# Patient Record
Sex: FEMALE | Race: WHITE | NOT HISPANIC OR LATINO | Employment: FULL TIME | URBAN - METROPOLITAN AREA
[De-identification: names, ages, dates, MRNs, and addresses within clinical notes are randomized per-mention and may not be internally consistent; named-entity substitution may affect disease eponyms.]

---

## 2017-01-04 ENCOUNTER — PATIENT MESSAGE (OUTPATIENT)
Dept: FAMILY MEDICINE | Facility: CLINIC | Age: 52
End: 2017-01-04

## 2017-01-04 RX ORDER — METHYLPREDNISOLONE 4 MG/1
TABLET ORAL
Qty: 1 PACKAGE | Refills: 0 | Status: SHIPPED | OUTPATIENT
Start: 2017-01-04 | End: 2017-01-25

## 2017-01-05 RX ORDER — DOXYCYCLINE 100 MG/1
100 CAPSULE ORAL 2 TIMES DAILY
Qty: 20 CAPSULE | Refills: 0 | Status: SHIPPED | OUTPATIENT
Start: 2017-01-05 | End: 2017-01-15

## 2017-01-12 RX ORDER — CEPHALEXIN 250 MG/1
CAPSULE ORAL
Qty: 60 EACH | Refills: 10 | Status: SHIPPED | OUTPATIENT
Start: 2017-01-12 | End: 2020-02-26 | Stop reason: SDUPTHER

## 2017-03-25 RX ORDER — FAMCICLOVIR 500 MG/1
TABLET ORAL
Qty: 30 TABLET | Refills: 1 | Status: SHIPPED | OUTPATIENT
Start: 2017-03-25 | End: 2017-10-17 | Stop reason: SDUPTHER

## 2017-03-25 RX ORDER — ALPRAZOLAM 0.25 MG/1
0.25 TABLET ORAL 2 TIMES DAILY
Qty: 60 TABLET | Refills: 1 | Status: SHIPPED | OUTPATIENT
Start: 2017-03-25 | End: 2017-10-17 | Stop reason: SDUPTHER

## 2017-04-13 ENCOUNTER — TELEPHONE (OUTPATIENT)
Dept: FAMILY MEDICINE | Facility: CLINIC | Age: 52
End: 2017-04-13

## 2017-04-13 DIAGNOSIS — Z00.00 ROUTINE HEALTH MAINTENANCE: Primary | ICD-10-CM

## 2017-04-27 ENCOUNTER — PATIENT OUTREACH (OUTPATIENT)
Dept: ADMINISTRATIVE | Facility: HOSPITAL | Age: 52
End: 2017-04-27
Payer: COMMERCIAL

## 2017-04-27 DIAGNOSIS — Z11.59 NEED FOR HEPATITIS C SCREENING TEST: Primary | ICD-10-CM

## 2017-04-27 DIAGNOSIS — Z12.39 BREAST CANCER SCREENING: ICD-10-CM

## 2017-05-04 ENCOUNTER — LAB VISIT (OUTPATIENT)
Dept: LAB | Facility: HOSPITAL | Age: 52
End: 2017-05-04
Attending: FAMILY MEDICINE
Payer: COMMERCIAL

## 2017-05-04 DIAGNOSIS — Z11.59 NEED FOR HEPATITIS C SCREENING TEST: ICD-10-CM

## 2017-05-04 DIAGNOSIS — Z00.00 ROUTINE HEALTH MAINTENANCE: ICD-10-CM

## 2017-05-04 LAB
ALBUMIN SERPL BCP-MCNC: 4 G/DL
ALP SERPL-CCNC: 78 U/L
ALT SERPL W/O P-5'-P-CCNC: 19 U/L
ANION GAP SERPL CALC-SCNC: 7 MMOL/L
AST SERPL-CCNC: 22 U/L
BASOPHILS # BLD AUTO: 0.04 K/UL
BASOPHILS NFR BLD: 0.6 %
BILIRUB SERPL-MCNC: 0.5 MG/DL
BUN SERPL-MCNC: 7 MG/DL
CALCIUM SERPL-MCNC: 9.6 MG/DL
CHLORIDE SERPL-SCNC: 107 MMOL/L
CHOLEST/HDLC SERPL: 3.8 {RATIO}
CO2 SERPL-SCNC: 27 MMOL/L
CREAT SERPL-MCNC: 0.8 MG/DL
DIFFERENTIAL METHOD: NORMAL
EOSINOPHIL # BLD AUTO: 0.2 K/UL
EOSINOPHIL NFR BLD: 2.9 %
ERYTHROCYTE [DISTWIDTH] IN BLOOD BY AUTOMATED COUNT: 13.9 %
EST. GFR  (AFRICAN AMERICAN): >60 ML/MIN/1.73 M^2
EST. GFR  (NON AFRICAN AMERICAN): >60 ML/MIN/1.73 M^2
GLUCOSE SERPL-MCNC: 94 MG/DL
HCT VFR BLD AUTO: 39.3 %
HDL/CHOLESTEROL RATIO: 26.4 %
HDLC SERPL-MCNC: 212 MG/DL
HDLC SERPL-MCNC: 56 MG/DL
HGB BLD-MCNC: 13.1 G/DL
LDLC SERPL CALC-MCNC: 138.2 MG/DL
LYMPHOCYTES # BLD AUTO: 2.2 K/UL
LYMPHOCYTES NFR BLD: 35.9 %
MCH RBC QN AUTO: 30.7 PG
MCHC RBC AUTO-ENTMCNC: 33.3 %
MCV RBC AUTO: 92 FL
MONOCYTES # BLD AUTO: 0.5 K/UL
MONOCYTES NFR BLD: 7.4 %
NEUTROPHILS # BLD AUTO: 3.3 K/UL
NEUTROPHILS NFR BLD: 53 %
NONHDLC SERPL-MCNC: 156 MG/DL
PLATELET # BLD AUTO: 303 K/UL
PMV BLD AUTO: 9.7 FL
POTASSIUM SERPL-SCNC: 4.8 MMOL/L
PROT SERPL-MCNC: 7.3 G/DL
RBC # BLD AUTO: 4.27 M/UL
SODIUM SERPL-SCNC: 141 MMOL/L
TRIGL SERPL-MCNC: 89 MG/DL
TSH SERPL DL<=0.005 MIU/L-ACNC: 1.41 UIU/ML
WBC # BLD AUTO: 6.21 K/UL

## 2017-05-04 PROCEDURE — 85025 COMPLETE CBC W/AUTO DIFF WBC: CPT

## 2017-05-04 PROCEDURE — 86803 HEPATITIS C AB TEST: CPT

## 2017-05-04 PROCEDURE — 84443 ASSAY THYROID STIM HORMONE: CPT

## 2017-05-04 PROCEDURE — 80053 COMPREHEN METABOLIC PANEL: CPT

## 2017-05-04 PROCEDURE — 36415 COLL VENOUS BLD VENIPUNCTURE: CPT | Mod: PO

## 2017-05-04 PROCEDURE — 80061 LIPID PANEL: CPT

## 2017-05-05 LAB — HCV AB SERPL QL IA: NEGATIVE

## 2017-05-10 ENCOUNTER — OFFICE VISIT (OUTPATIENT)
Dept: FAMILY MEDICINE | Facility: CLINIC | Age: 52
End: 2017-05-10
Payer: COMMERCIAL

## 2017-05-10 VITALS
SYSTOLIC BLOOD PRESSURE: 107 MMHG | HEIGHT: 67 IN | DIASTOLIC BLOOD PRESSURE: 67 MMHG | BODY MASS INDEX: 30.1 KG/M2 | HEART RATE: 88 BPM | WEIGHT: 191.81 LBS

## 2017-05-10 DIAGNOSIS — Z00.00 ROUTINE CHECK-UP: Primary | ICD-10-CM

## 2017-05-10 PROCEDURE — 90715 TDAP VACCINE 7 YRS/> IM: CPT | Mod: S$GLB,,, | Performed by: FAMILY MEDICINE

## 2017-05-10 PROCEDURE — 99999 PR PBB SHADOW E&M-EST. PATIENT-LVL II: CPT | Mod: PBBFAC,,, | Performed by: FAMILY MEDICINE

## 2017-05-10 PROCEDURE — 99396 PREV VISIT EST AGE 40-64: CPT | Mod: 25,S$GLB,, | Performed by: FAMILY MEDICINE

## 2017-05-10 PROCEDURE — 90471 IMMUNIZATION ADMIN: CPT | Mod: S$GLB,,, | Performed by: FAMILY MEDICINE

## 2017-05-10 RX ORDER — PYRIDOXINE HCL (VITAMIN B6) 50 MG
TABLET ORAL
COMMUNITY
Start: 2017-05-08

## 2017-05-10 RX ORDER — LEVOMEFOLATE CALCIUM 15 MG
TABLET ORAL
COMMUNITY
Start: 2017-05-08

## 2017-05-10 RX ORDER — HYDROXOCOBALAMIN 1000 UG/ML
INJECTION, SOLUTION INTRAMUSCULAR
COMMUNITY
Start: 2017-04-04

## 2017-05-10 RX ORDER — ERGOCALCIFEROL 1.25 MG/1
CAPSULE ORAL
COMMUNITY
Start: 2017-05-08

## 2017-05-10 RX ORDER — PROGESTERONE 200 MG/1
CAPSULE ORAL
COMMUNITY
Start: 2017-03-24

## 2017-05-10 NOTE — PROGRESS NOTES
The patient presents today for general health evaluation and counseling      Past Medical History:  Past Medical History:   Diagnosis Date    Asthma      Past Surgical History:   Procedure Laterality Date     SECTION      ENDOMETRIAL ABLATION  2013    TUBAL LIGATION       Review of patient's allergies indicates:   Allergen Reactions    Iodinated contrast media - oral and iv dye Shortness Of Breath and Rash     Current Outpatient Prescriptions on File Prior to Visit   Medication Sig Dispense Refill    ADVAIR DISKUS 100-50 mcg/dose diskus inhaler USE 1 INHALATION TWO TIMES A DAY 60 each 10    albuterol (VENTOLIN HFA) 90 mcg/actuation inhaler Inhale 2 puffs into the lungs every 6 (six) hours as needed for Wheezing. 54 g 3    alprazolam (XANAX) 0.25 MG tablet Take 1 tablet (0.25 mg total) by mouth 2 (two) times daily. 60 tablet 1    famciclovir (FAMVIR) 500 MG tablet TAKE ONE TABLET BY MOUTH EVERY 8 HOURS -IMPORTANT: FINISH ALL OF THISMEDICINE UNLESS OTHERWISE DIRECTED BY YOUR DOCTOR. 30 tablet 1    hydrOXYzine pamoate (VISTARIL) 25 MG Cap Take 1 capsule (25 mg total) by mouth every 4 (four) hours as needed (Nausea or vomiting). 30 capsule 1    [DISCONTINUED] budesonide-formoterol 160-4.5 mcg (SYMBICORT) 160-4.5 mcg/actuation HFAA Inhale 2 puffs into the lungs every 12 (twelve) hours. 10.2 g 3    [DISCONTINUED] cetirizine (ZYRTEC) 10 MG tablet Take 1 tablet (10 mg total) by mouth once daily. 10 tablet 0    [DISCONTINUED] citalopram (CELEXA) 10 MG tablet Take 1 tablet (10 mg total) by mouth once daily. 90 tablet 1    [DISCONTINUED] ibuprofen (ADVIL,MOTRIN) 800 MG tablet Take 1 tablet (800 mg total) by mouth 3 (three) times daily. 20 tablet 0    [DISCONTINUED] scopolamine (TRANSDERM-SCOP) 1.5 mg (1 mg over 3 days) Place 1 patch (1.5 mg total) onto the skin every 72 hours. 28 patch 1     No current facility-administered medications on file prior to visit.      Social History     Social History     Marital status:      Spouse name: N/A    Number of children: N/A    Years of education: N/A     Occupational History    Not on file.     Social History Main Topics    Smoking status: Never Smoker    Smokeless tobacco: Never Used    Alcohol use Yes      Comment: wine social    Drug use: No    Sexual activity: Yes     Partners: Male     Birth control/ protection: Surgical     Other Topics Concern    Not on file     Social History Narrative     Family History   Problem Relation Age of Onset    Cancer Mother      ovarian    Heart disease Father     Kidney disease Paternal Aunt     Cancer Maternal Grandmother          ROS:GENERAL: No fever, chills, fatigability or weight loss.  SKIN: No rashes, itching or changes in color or texture of skin.  HEAD: No headaches or recent head trauma.EYES: Visual acuity fine. No photophobia, ocular pain or diplopia.EARS: Denies ear pain, discharge or vertigo.NOSE: No loss of smell, no epistaxis or postnasal drip.MOUTH & THROAT: No hoarseness or change in voice. No excessive gum bleeding.NODES: Denies swollen glands.  CHEST: Denies KOHLER, cyanosis, wheezing, cough and sputum production.  CARDIOVASCULAR: Denies chest pain, PND, orthopnea or reduced exercise tolerance.  ABDOMEN: Appetite fine. No weight loss. Denies diarrhea, abdominal pain, hematemesis or blood in stool.  URINARY: No flank pain, dysuria or hematuria.  PERIPHERAL VASCULAR: No claudication or cyanosis.  MUSCULOSKELETAL: See above.  NEUROLOGIC: No history of seizures, paralysis, alteration of gait or coordination.  PE:   HEAD: Normocephalic, atraumatic.EYES: PERRL. EOMI.   EARS: TM's intact. Light reflex normal. No retraction or perforation.   NOSE: Mucosa pink. Airway clear.MOUTH & THROAT: No tonsillar enlargement. No pharyngeal erythema or exudate. No stridor.  NODES: No cervical, axillary or inguinal lymph node enlargement.  CHEST: Lungs clear to auscultation.  CARDIOVASCULAR: Normal S1, S2. No rubs,  murmurs or gallops.  ABDOMEN: Bowel sounds normal. Not distended. Soft. No tenderness or masses.  MUSCULOSKELETAL: No palpable abnormality  NEUROLOGIC: Cranial Nerves: II-XII grossly intact.  Motor: 5/5 strength major flexors/extensors.  DTR's: Knees, Ankles 2+ and equal bilaterally; downgoing toes.  Sensory: Intact to light touch distally.  Gait & Posture: Normal gait and fine motion. No cerebellar signs.     Impression:Routine health check  Plan:Lab eval  Rec diet and ex recs  Rev age appropriate screenings

## 2017-05-15 ENCOUNTER — TELEPHONE (OUTPATIENT)
Dept: GASTROENTEROLOGY | Facility: CLINIC | Age: 52
End: 2017-05-15

## 2017-10-17 RX ORDER — FAMCICLOVIR 500 MG/1
TABLET ORAL
Qty: 30 TABLET | Refills: 11 | Status: SHIPPED | OUTPATIENT
Start: 2017-10-17 | End: 2021-10-12 | Stop reason: SDUPTHER

## 2017-10-17 RX ORDER — ALPRAZOLAM 0.25 MG/1
0.25 TABLET ORAL 2 TIMES DAILY
Qty: 60 TABLET | Refills: 5 | Status: SHIPPED | OUTPATIENT
Start: 2017-10-17 | End: 2020-02-26

## 2017-12-23 RX ORDER — DOXYCYCLINE 100 MG/1
100 CAPSULE ORAL 2 TIMES DAILY
Qty: 14 CAPSULE | Refills: 0 | Status: SHIPPED | OUTPATIENT
Start: 2017-12-23 | End: 2019-09-06 | Stop reason: ALTCHOICE

## 2017-12-23 RX ORDER — METHYLPREDNISOLONE 4 MG/1
TABLET ORAL
Qty: 1 PACKAGE | Refills: 0 | Status: SHIPPED | OUTPATIENT
Start: 2017-12-23 | End: 2018-01-13

## 2018-09-24 ENCOUNTER — PATIENT OUTREACH (OUTPATIENT)
Dept: ADMINISTRATIVE | Facility: HOSPITAL | Age: 53
End: 2018-09-24

## 2018-09-24 NOTE — PROGRESS NOTES
Contacted patient as a reminder for pap smear. Left message requesting a call back.    Patient returned call and states she had a pap smear and mammogram with Alysia Bhandari. Will get [facility] and request report.

## 2018-11-09 ENCOUNTER — PATIENT MESSAGE (OUTPATIENT)
Dept: FAMILY MEDICINE | Facility: CLINIC | Age: 53
End: 2018-11-09

## 2018-11-12 RX ORDER — ALBUTEROL SULFATE 90 UG/1
2 AEROSOL, METERED RESPIRATORY (INHALATION) EVERY 6 HOURS PRN
Qty: 54 G | Refills: 12 | Status: SHIPPED | OUTPATIENT
Start: 2018-11-12 | End: 2020-02-24

## 2018-12-05 ENCOUNTER — PATIENT OUTREACH (OUTPATIENT)
Dept: ADMINISTRATIVE | Facility: HOSPITAL | Age: 53
End: 2018-12-05

## 2018-12-06 ENCOUNTER — PATIENT MESSAGE (OUTPATIENT)
Dept: ADMINISTRATIVE | Facility: HOSPITAL | Age: 53
End: 2018-12-06

## 2019-01-22 ENCOUNTER — PATIENT MESSAGE (OUTPATIENT)
Dept: ADMINISTRATIVE | Facility: HOSPITAL | Age: 54
End: 2019-01-22

## 2019-01-23 ENCOUNTER — PATIENT OUTREACH (OUTPATIENT)
Dept: ADMINISTRATIVE | Facility: HOSPITAL | Age: 54
End: 2019-01-23

## 2019-01-23 DIAGNOSIS — Z12.11 ENCOUNTER FOR SCREENING COLONOSCOPY FOR NON-HIGH-RISK PATIENT: Primary | ICD-10-CM

## 2019-08-26 ENCOUNTER — TELEPHONE (OUTPATIENT)
Dept: FAMILY MEDICINE | Facility: CLINIC | Age: 54
End: 2019-08-26

## 2019-08-26 NOTE — TELEPHONE ENCOUNTER
----- Message from Saniya Arambula sent at 8/26/2019 10:49 AM CDT -----  Contact: self  Type:  Patient Returning Call    Who Called:pt  Who Left Message for Patient:jt  Does the patient know what this is regarding?:no  Would the patient rather a call back or a response via [facility]? Call back  Best Call Back Number:[phone]  Additional Information: none    Thanks,  Saniya Arambula

## 2019-08-26 NOTE — TELEPHONE ENCOUNTER
----- Message from Marco Castañeda sent at 8/26/2019  8:38 AM CDT -----  The pt would like to be seen by Dr. Dickerson for a consult for a colonoscopy procedure.  The pt can be reached at [phone].

## 2019-09-05 ENCOUNTER — PATIENT OUTREACH (OUTPATIENT)
Dept: ADMINISTRATIVE | Facility: HOSPITAL | Age: 54
End: 2019-09-05

## 2019-09-06 ENCOUNTER — PATIENT MESSAGE (OUTPATIENT)
Dept: FAMILY MEDICINE | Facility: CLINIC | Age: 54
End: 2019-09-06

## 2019-09-06 ENCOUNTER — OFFICE VISIT (OUTPATIENT)
Dept: FAMILY MEDICINE | Facility: CLINIC | Age: 54
End: 2019-09-06
Payer: COMMERCIAL

## 2019-09-06 ENCOUNTER — PATIENT OUTREACH (OUTPATIENT)
Dept: ADMINISTRATIVE | Facility: HOSPITAL | Age: 54
End: 2019-09-06

## 2019-09-06 DIAGNOSIS — Z12.11 COLON CANCER SCREENING: Primary | ICD-10-CM

## 2019-09-06 PROCEDURE — 99213 PR OFFICE/OUTPT VISIT, EST, LEVL III, 20-29 MIN: ICD-10-PCS | Mod: 95,,, | Performed by: FAMILY MEDICINE

## 2019-09-06 PROCEDURE — 99213 OFFICE O/P EST LOW 20 MIN: CPT | Mod: 95,,, | Performed by: FAMILY MEDICINE

## 2019-09-06 NOTE — PROGRESS NOTES
Primary Care Telemedicine Note    The patient location is:  Patient Home   The chief complaint leading to consultation is: colon cancer screening  Total time spent with patient: 12 minutes      Visit type: Virtual visit with synchronous audio only and video  Each patient to whom he or she provides medical services by telemedicine is:  (1) informed of the relationship between the physician and patient and the respective role of any other health care provider with respect to management of the patient; and (2) notified that he or she may decline to receive medical services by telemedicine and may withdraw from such care at any time.            Subjective:      Patient ID: Christina Bunn is a 54 y.o. female.    Chief Complaint: desires colon cancer screening  HPI the patient schedule an appointment as she wanted to discuss performing a colonoscopy on her for screening purposes.  The patient does not have any family history or personal history of colon polyps or colon cancer.  She has never had a colonoscopy for screening before.  Patient is not having any abdominal pain, bleeding, nausea vomiting, melena, hematochezia, diarrhea or constipation.  We reviewed her complete past medical history today and I updated and reconciled her med list.  In addition, I updated her family history which again is negative for GI cancers or pre cancers.  We also reviewed her gyn history of screening and she recently had a mammogram and Pap smear.  She will of low that dated to us for reconciliation with our health maintenance area.    Health Maintenance Due   Topic Date Due    Colonoscopy  2015    Pap Smear with HPV Cotest  2018       Past Medical History:  Past Medical History:   Diagnosis Date    Asthma      Past Surgical History:   Procedure Laterality Date     SECTION      ENDOMETRIAL ABLATION  2013    OOPHORECTOMY      one ovary removed    TUBAL LIGATION       Review of patient's allergies indicates:    Allergen Reactions    Iodinated contrast media Shortness Of Breath and Rash     Current Outpatient Medications on File Prior to Visit   Medication Sig Dispense Refill    ADVAIR DISKUS 100-50 mcg/dose diskus inhaler USE 1 INHALATION TWO TIMES A DAY 60 each 10    albuterol (VENTOLIN HFA) 90 mcg/actuation inhaler Inhale 2 puffs into the lungs every 6 (six) hours as needed for Wheezing. 54 g 12    alprazolam (XANAX) 0.25 MG tablet Take 1 tablet (0.25 mg total) by mouth 2 (two) times daily. 60 tablet 5    famciclovir (FAMVIR) 500 MG tablet TAKE ONE TABLET BY MOUTH EVERY 8 HOURS -IMPORTANT: FINISH ALL OF THISMEDICINE UNLESS OTHERWISE DIRECTED BY YOUR DOCTOR. 30 tablet 11    hydroxocobalamin 1,000 mcg/mL Soln       L-METHYLFOLATE 15 mg Tab       progesterone (PROMETRIUM) 200 MG capsule       VITAMIN B-6 50 MG tablet       VITAMIN D2 50,000 unit capsule       [DISCONTINUED] doxycycline (VIBRAMYCIN) 100 MG Cap Take 1 capsule (100 mg total) by mouth 2 (two) times daily. 14 capsule 0    [DISCONTINUED] hydrOXYzine pamoate (VISTARIL) 25 MG Cap Take 1 capsule (25 mg total) by mouth every 4 (four) hours as needed (Nausea or vomiting). 30 capsule 1     No current facility-administered medications on file prior to visit.      Social History     Socioeconomic History    Marital status:      Spouse name: Not on file    Number of children: Not on file    Years of education: Not on file    Highest education level: Not on file   Occupational History    Not on file   Social Needs    Financial resource strain: Not on file    Food insecurity:     Worry: Not on file     Inability: Not on file    Transportation needs:     Medical: Not on file     Non-medical: Not on file   Tobacco Use    Smoking status: Never Smoker    Smokeless tobacco: Never Used   Substance and Sexual Activity    Alcohol use: Yes     Comment: wine social    Drug use: No    Sexual activity: Yes     Partners: Male     Birth  control/protection: Surgical   Lifestyle    Physical activity:     Days per week: Not on file     Minutes per session: Not on file    Stress: Not on file   Relationships    Social connections:     Talks on phone: Not on file     Gets together: Not on file     Attends Yarsanism service: Not on file     Active member of club or organization: Not on file     Attends meetings of clubs or organizations: Not on file     Relationship status: Not on file   Other Topics Concern    Not on file   Social History Narrative    Not on file     Family History   Problem Relation Age of Onset    Cancer Mother         ovarian    Ovarian cancer Mother 45    Heart disease Father     Kidney disease Paternal Aunt     Cancer Maternal Grandmother     Ovarian cancer Maternal Grandmother         older, age unknown           Review of Systems   Constitutional: Negative for chills and fever.   Respiratory: Negative for cough, shortness of breath and wheezing (She has not used albuterol in 6 months.).    Cardiovascular: Negative for chest pain and palpitations.   Gastrointestinal: Negative for abdominal distention, abdominal pain, anal bleeding, blood in stool, constipation, diarrhea, nausea, rectal pain and vomiting.   Genitourinary: Negative for dysuria and hematuria.       Objective:     108/72. Pulse 77 taken at home.     Physical Exam   Constitutional: She appears well-developed and well-nourished.   HENT:   Head: Normocephalic.   Neck: Normal range of motion.   Pulmonary/Chest: Effort normal. No respiratory distress.   Psychiatric: She has a normal mood and affect. Her behavior is normal. Judgment and thought content normal.           Assessment:       1. Colon cancer screening        Plan:       Diagnoses and all orders for this visit:    Colon cancer screening  -     Case request GI: COLONOSCOPY    Other orders  -     MyChart Patient Entered Blood Pressure  -     MyChart Patient Entered Pulse  -     MyChart Patient Entered  Weight     I sent her the MiraLax prep and requested that she up load her Pap smear data to us.

## 2019-09-06 NOTE — Clinical Note
Please get her Pap smear dated that she is to be sending to us via my chart and of loaded in the health maintenance area.

## 2019-09-06 NOTE — PROGRESS NOTES
Pt PAP received from [facility] in [place], [place] dated 8/29/19 linked to  and updated.

## 2019-09-10 ENCOUNTER — TELEPHONE (OUTPATIENT)
Dept: ENDOSCOPY | Facility: HOSPITAL | Age: 54
End: 2019-09-10

## 2019-09-10 NOTE — TELEPHONE ENCOUNTER
Endoscopy Scheduling Questionnaire:    1. Have you been admitted overnight to the hospital in the past 3 months? no  2. Have you had a cardiac stent placed in the past 12 months? no  3. Have you had a stroke or heart attack in the past 6 months? no  4. Have you had any chest pain in the past 3 months? no      If so, have you been evaluated by your PCP or Cardiologist? no  5. Do you take prescription weight loss medication? no  6. Have you been diagnosed with Diverticulitis within the past 3 months? no  7. Are you on dialysis? no  8. Are you diabetic? no Do you have an insulin pump? no  9. Do you have any other health issues that you feel might limit your ability to safely have the procedure and/or sedation? no  10. Is the patient over 81 yo? no        If so, has the patient been seen by their PCP or GI in the last 6 months? N/A       -I have reviewed the last colonoscopy for recommendations regarding surveillance and bowel prep  is NA  -I have reviewed the patient's medications and allergies. She is not on high risk medication, A clearance request NA  -I have verified the pharmacy information. The prep being used is Miralax. The patient's prep instructions were sent via [facility] patient portal..    Date Endoscopy Scheduled: Date: 10/12/19

## 2019-09-11 ENCOUNTER — ANESTHESIA EVENT (OUTPATIENT)
Dept: ENDOSCOPY | Facility: HOSPITAL | Age: 54
End: 2019-09-11
Payer: COMMERCIAL

## 2019-09-18 ENCOUNTER — TELEPHONE (OUTPATIENT)
Dept: RADIOLOGY | Facility: HOSPITAL | Age: 54
End: 2019-09-18

## 2019-10-12 ENCOUNTER — ANESTHESIA (OUTPATIENT)
Dept: ENDOSCOPY | Facility: HOSPITAL | Age: 54
End: 2019-10-12
Payer: COMMERCIAL

## 2019-10-12 ENCOUNTER — HOSPITAL ENCOUNTER (OUTPATIENT)
Facility: HOSPITAL | Age: 54
Discharge: HOME | End: 2019-10-12
Attending: INTERNAL MEDICINE | Admitting: INTERNAL MEDICINE
Payer: COMMERCIAL

## 2019-10-12 VITALS
TEMPERATURE: 98 F | DIASTOLIC BLOOD PRESSURE: 74 MMHG | OXYGEN SATURATION: 100 % | BODY MASS INDEX: 28.37 KG/M2 | WEIGHT: 180.75 LBS | RESPIRATION RATE: 16 BRPM | HEART RATE: 67 BPM | SYSTOLIC BLOOD PRESSURE: 102 MMHG | HEIGHT: 67 IN

## 2019-10-12 DIAGNOSIS — Z12.11 COLON CANCER SCREENING: Primary | ICD-10-CM

## 2019-10-12 PROCEDURE — 45385 COLONOSCOPY W/LESION REMOVAL: CPT | Performed by: INTERNAL MEDICINE

## 2019-10-12 PROCEDURE — 45381 COLONOSCOPY SUBMUCOUS NJX: CPT | Performed by: INTERNAL MEDICINE

## 2019-10-12 PROCEDURE — 45380 PR COLONOSCOPY,BIOPSY: ICD-10-PCS | Mod: 59,,, | Performed by: INTERNAL MEDICINE

## 2019-10-12 PROCEDURE — 63600175 PHARM REV CODE 636 W HCPCS: Performed by: NURSE ANESTHETIST, CERTIFIED REGISTERED

## 2019-10-12 PROCEDURE — 88305 TISSUE EXAM BY PATHOLOGIST: CPT | Mod: 26,,, | Performed by: PATHOLOGY

## 2019-10-12 PROCEDURE — 37000009 HC ANESTHESIA EA ADD 15 MINS: Performed by: INTERNAL MEDICINE

## 2019-10-12 PROCEDURE — 27201089 HC SNARE, DISP (ANY): Performed by: INTERNAL MEDICINE

## 2019-10-12 PROCEDURE — 45385 COLONOSCOPY W/LESION REMOVAL: CPT | Mod: 33,,, | Performed by: INTERNAL MEDICINE

## 2019-10-12 PROCEDURE — 45381 COLONOSCOPY SUBMUCOUS NJX: CPT | Mod: 51,,, | Performed by: INTERNAL MEDICINE

## 2019-10-12 PROCEDURE — 45380 COLONOSCOPY AND BIOPSY: CPT | Mod: 59,,, | Performed by: INTERNAL MEDICINE

## 2019-10-12 PROCEDURE — 88305 TISSUE EXAM BY PATHOLOGIST: CPT | Performed by: PATHOLOGY

## 2019-10-12 PROCEDURE — 27201012 HC FORCEPS, HOT/COLD, DISP: Performed by: INTERNAL MEDICINE

## 2019-10-12 PROCEDURE — 37000008 HC ANESTHESIA 1ST 15 MINUTES: Performed by: INTERNAL MEDICINE

## 2019-10-12 PROCEDURE — 45381 PR COLONOSCPY,FLEX,W/DIR SUBMUC INJECT: ICD-10-PCS | Mod: 51,,, | Performed by: INTERNAL MEDICINE

## 2019-10-12 PROCEDURE — 45380 COLONOSCOPY AND BIOPSY: CPT | Performed by: INTERNAL MEDICINE

## 2019-10-12 PROCEDURE — 88305 TISSUE SPECIMEN TO PATHOLOGY - SURGERY: ICD-10-PCS | Mod: 26,,, | Performed by: PATHOLOGY

## 2019-10-12 PROCEDURE — 45385 PR COLONOSCOPY,REMV LESN,SNARE: ICD-10-PCS | Mod: 33,,, | Performed by: INTERNAL MEDICINE

## 2019-10-12 RX ORDER — SODIUM CHLORIDE, SODIUM LACTATE, POTASSIUM CHLORIDE, CALCIUM CHLORIDE 600; 310; 30; 20 MG/100ML; MG/100ML; MG/100ML; MG/100ML
INJECTION, SOLUTION INTRAVENOUS CONTINUOUS PRN
Status: DISCONTINUED | OUTPATIENT
Start: 2019-10-12 | End: 2019-10-12

## 2019-10-12 RX ORDER — PROPOFOL 10 MG/ML
VIAL (ML) INTRAVENOUS
Status: DISCONTINUED | OUTPATIENT
Start: 2019-10-12 | End: 2019-10-12

## 2019-10-12 RX ADMIN — SODIUM CHLORIDE, SODIUM LACTATE, POTASSIUM CHLORIDE, AND CALCIUM CHLORIDE: .6; .31; .03; .02 INJECTION, SOLUTION INTRAVENOUS at 09:10

## 2019-10-12 RX ADMIN — PROPOFOL 600 MG: 10 INJECTION, EMULSION INTRAVENOUS at 10:10

## 2019-10-12 NOTE — ANESTHESIA POSTPROCEDURE EVALUATION
Anesthesia Post Evaluation    Patient: Christina Bunn    Procedure(s) Performed: Procedure(s) (LRB):  COLONOSCOPY (N/A)    Final Anesthesia Type: MAC  Patient location during evaluation: GI PACU  Patient participation: No - Unable to Participate, Sedation  Level of consciousness: responds to stimulation  Post-procedure vital signs: reviewed and stable  Pain management: adequate  Airway patency: patent  PONV status at discharge: No PONV  Anesthetic complications: no      Cardiovascular status: blood pressure returned to baseline  Respiratory status: unassisted  Hydration status: euvolemic  Follow-up not needed.          Vitals Value Taken Time   /65 10/12/2019  7:50 AM   Temp 36.9 °C (98.4 °F) 10/12/2019  7:50 AM   Pulse 71 10/12/2019  7:50 AM   Resp 18 10/12/2019  7:50 AM   SpO2 100 % 10/12/2019  7:50 AM         No case tracking events are documented in the log.      Pain/Benton Score: No data recorded

## 2019-10-12 NOTE — ANESTHESIA PREPROCEDURE EVALUATION
10/12/2019  Christina Bunn is a 54 y.o., female.    Anesthesia Evaluation    I have reviewed the Patient Summary Reports.    I have reviewed the Nursing Notes.   I have reviewed the Medications.     Review of Systems  Anesthesia Hx:  No previous Anesthesia    Hematology/Oncology:  Hematology Normal   Oncology Normal     EENT/Dental:EENT/Dental Normal   Cardiovascular:  Cardiovascular Normal     Pulmonary:   Asthma mild    Renal/:  Renal/ Normal     Hepatic/GI:   Bowel Prep.    Musculoskeletal:  Musculoskeletal Normal    Neurological:  Neurology Normal    Endocrine:  Endocrine Normal    Dermatological:  Skin Normal    Psych:  Psychiatric Normal           Physical Exam  General:  Well nourished    Airway/Jaw/Neck:  Airway Findings: Mouth Opening: Normal Tongue: Normal  General Airway Assessment: Adult  Mallampati: II  Improves to II with phonation.  TM Distance: Normal, at least 6 cm       Chest/Lungs:  Chest/Lungs Findings: Normal Respiratory Rate, Clear to auscultation     Heart/Vascular:  Heart Findings: Rate: Normal  Rhythm: Regular Rhythm  Sounds: Normal        Mental Status:  Mental Status Findings:  Alert and Oriented         Anesthesia Plan  Type of Anesthesia, risks & benefits discussed:  Anesthesia Type:  MAC  Patient's Preference:   Intra-op Monitoring Plan: standard ASA monitors  Intra-op Monitoring Plan Comments:   Post Op Pain Control Plan: per primary service following discharge from PACU  Post Op Pain Control Plan Comments:   Induction:   IV  Beta Blocker:  Patient is not currently on a Beta-Blocker (No further documentation required).       Informed Consent: Patient understands risks and agrees with Anesthesia plan.  Questions answered. Anesthesia consent signed with patient.  ASA Score: 2     Day of Surgery Review of History & Physical: I have interviewed and examined the patient. I have  reviewed the patient's H&P dated:  There are no significant changes.  H&P update referred to the surgeon.         Ready For Surgery From Anesthesia Perspective.

## 2019-10-12 NOTE — [employer] PATIENT INSTRUCTIONS
Discharge Summary/Instructions after an Endoscopic Procedure  Patient Name: Christina Bunn  Patient MRN: [id number]  Patient YOB: 1965 Saturday, October 12, 2019 Krista Tolbert MD  RESTRICTIONS:  During your procedure today, you received medications for sedation.  These   medications may affect your judgment, balance and coordination.  Therefore,   for 24 hours, you have the following restrictions:   - DO NOT drive a car, operate machinery, make legal/financial decisions,   sign important papers or drink alcohol.    ACTIVITY:  Today: no heavy lifting, straining or running due to procedural   sedation/anesthesia.  The following day: return to full activity including work.  DIET:  Eat and drink normally unless instructed otherwise.     TREATMENT FOR COMMON SIDE EFFECTS:  - Mild abdominal pain, nausea, belching, bloating or excessive gas:  rest,   eat lightly and use a heating pad.  - Sore Throat: treat with throat lozenges and/or gargle with warm salt   water.  - Because air was used during the procedure, expelling large amounts of air   from your rectum or belching is normal.  - If a bowel prep was taken, you may not have a bowel movement for 1-3 days.    This is normal.  SYMPTOMS TO WATCH FOR AND REPORT TO YOUR PHYSICIAN:  1. Abdominal pain or bloating, other than gas cramps.  2. Chest pain.  3. Back pain.  4. Signs of infection such as: chills or fever occurring within 24 hours   after the procedure.  5. Rectal bleeding, which would show as bright red, maroon, or black stools.   (A tablespoon of blood from the rectum is not serious, especially if   hemorrhoids are present.)  6. Vomiting.  7. Weakness or dizziness.  GO DIRECTLY TO THE NEAREST EMERGENCY ROOM IF YOU HAVE ANY OF THE FOLLOWING:      Difficulty breathing              Chills and/or fever over 101 F   Persistent vomiting and/or vomiting blood   Severe abdominal pain   Severe chest pain   Black, tarry stools   Bleeding- more than one  tablespoon   Any other symptom or condition that you feel may need urgent attention  Your doctor recommends these additional instructions:  If any biopsies were taken, your doctors clinic will contact you in 1 to 2   weeks with any results.  - Patient has a contact number available for emergencies.  The signs and   symptoms of potential delayed complications were discussed with the   patient.  Return to normal activities tomorrow.  Written discharge   instructions were provided to the patient.   - Discharge patient to home (via wheelchair).   - Resume previous diet today.   - Continue present medications.   - No aspirin, ibuprofen, naproxen, or other non-steroidal anti-inflammatory   drugs for 7 days after polyp removal.   - Await pathology results.   - Repeat colonoscopy in 5 years for surveillance.  For questions, problems or results please call your physician Krista Tolbert MD at Work:  [phone]  If you have any questions about the above instructions, call the GI   department at [phone] or call the endoscopy unit at [phone]   from 7am until 3 pm.  [facility] [place], EMERGENCY ROOM PHONE NUMBER:   [phone]  IF A COMPLICATION OR EMERGENCY SITUATION ARISES AND YOU ARE UNABLE TO REACH   YOUR PHYSICIAN - GO DIRECTLY TO THE EMERGENCY ROOM.  I have read or have had read to me these discharge instructions for my   procedure and have received a written copy.  I understand these   instructions and will follow-up with my physician if I have any questions.     __________________________________       _____________________________________  Nurse Signature                                          Patient/Designated   Responsible Party Signature  MD Krista Renae MD  10/12/2019 10:22:02 AM  This report has been verified and signed electronically.  [employer]

## 2019-10-12 NOTE — ANESTHESIA RELEASE NOTE
"Anesthesia Release from PACU Note    Patient: Christina Bunn    Procedure(s) Performed: Procedure(s) (LRB):  COLONOSCOPY (N/A)    Anesthesia type: MAC    Post pain: Adequate analgesia    Post assessment: no apparent anesthetic complications    Last Vitals:   Visit Vitals  BP (!) 149/65 (BP Location: Left arm, Patient Position: Lying)   Pulse 71   Temp 36.9 °C (98.4 °F) (Temporal)   Resp 18   Ht 5' 7" (1.702 m)   Wt 82 kg (180 lb 12.4 oz)   LMP 01/28/2015 (Approximate)   SpO2 100%   Breastfeeding? No   BMI 28.31 kg/m²       Post vital signs: stable    Level of consciousness: responds to stimulation    Nausea/Vomiting: no nausea/no vomiting    Complications: none    Airway Patency: patent    Respiratory: unassisted, room air    Cardiovascular: stable and blood pressure at baseline    Hydration: euvolemic  "

## 2019-10-12 NOTE — H&P
PRE PROCEDURE H&P    Patient Name: Christina Bunn  MRN: [id number]  : 1965  Date of Procedure:  10/12/2019  Referring Physician: Arturo Dickerson MD  Primary Physician: Elton Moss MD  Procedure Physician: Krista Tolbert MD       Planned Procedure: Colonoscopy  Diagnosis: screening for colon cancer  Chief Complaint: Same as above    HPI: Patient is an 54 y.o. female is here for the above.     Last colonoscopy: no prior   Family history: negative   Anticoagulation: none     Past Medical History:   Past Medical History:   Diagnosis Date    Asthma         Past Surgical History:  Past Surgical History:   Procedure Laterality Date    APPENDECTOMY       SECTION      ENDOMETRIAL ABLATION      OOPHORECTOMY      one ovary removed    TONSILLECTOMY      TUBAL LIGATION          Home Medications:  Prior to Admission medications    Medication Sig Start Date End Date Taking? Authorizing Provider   albuterol (VENTOLIN HFA) 90 mcg/actuation inhaler Inhale 2 puffs into the lungs every 6 (six) hours as needed for Wheezing. 18  Yes Elton Moss MD   hydroxocobalamin 1,000 mcg/mL Soln  17  Yes Historical Provider, MD   L-METHYLFOLATE 15 mg Tab  17  Yes Historical Provider, MD   progesterone (PROMETRIUM) 200 MG capsule  3/24/17  Yes Historical Provider, MD   VITAMIN B-6 50 MG tablet  17  Yes Historical Provider, MD   VITAMIN D2 50,000 unit capsule  17  Yes Historical Provider, MD   ADVAIR DISKUS 100-50 mcg/dose diskus inhaler USE 1 INHALATION TWO TIMES A DAY 17   Elton Moss MD   alprazolam (XANAX) 0.25 MG tablet Take 1 tablet (0.25 mg total) by mouth 2 (two) times daily. 10/17/17   Elton Moss MD   famciclovir (FAMVIR) 500 MG tablet TAKE ONE TABLET BY MOUTH EVERY 8 HOURS -IMPORTANT: FINISH ALL OF THISMEDICINE UNLESS OTHERWISE DIRECTED BY YOUR DOCTOR. 10/17/17   Elton Moss MD        Allergies:  Review of patient's allergies indicates:   Allergen Reactions    Iodinated  "contrast media Shortness Of Breath and Rash        Social History:   Social History     Socioeconomic History    Marital status:      Spouse name: Not on file    Number of children: Not on file    Years of education: Not on file    Highest education level: Not on file   Occupational History    Not on file   Social Needs    Financial resource strain: Not on file    Food insecurity:     Worry: Not on file     Inability: Not on file    Transportation needs:     Medical: Not on file     Non-medical: Not on file   Tobacco Use    Smoking status: Never Smoker    Smokeless tobacco: Never Used   Substance and Sexual Activity    Alcohol use: Yes     Comment: wine social    Drug use: No    Sexual activity: Yes     Partners: Male     Birth control/protection: Surgical   Lifestyle    Physical activity:     Days per week: Not on file     Minutes per session: Not on file    Stress: Not on file   Relationships    Social connections:     Talks on phone: Not on file     Gets together: Not on file     Attends Jewish service: Not on file     Active member of club or organization: Not on file     Attends meetings of clubs or organizations: Not on file     Relationship status: Not on file   Other Topics Concern    Not on file   Social History Narrative    Not on file       Family History:  Family History   Problem Relation Age of Onset    Cancer Mother         ovarian    Ovarian cancer Mother 45    Heart disease Father     Kidney disease Paternal Aunt     Cancer Maternal Grandmother     Ovarian cancer Maternal Grandmother         older, age unknown       ROS: No acute cardiac events, no acute respiratory complaints.     Physical Exam (all patients):    BP (!) 149/65 (BP Location: Left arm, Patient Position: Lying)   Pulse 71   Temp 98.4 °F (36.9 °C) (Temporal)   Resp 18   Ht 5' 7" (1.702 m)   Wt 82 kg (180 lb 12.4 oz)   LMP 01/28/2015 (Approximate)   SpO2 100%   Breastfeeding? No   BMI 28.31 " kg/m²   Lungs: Clear to auscultation bilaterally, respirations unlabored  Heart: Regular rate and rhythm, S1 and S2 normal, no obvious murmurs  Abdomen:         Soft, non-tender, bowel sounds normal, no masses, no organomegaly    Lab Results   Component Value Date    WBC 6.21 05/04/2017    MCV 92 05/04/2017    RDW 13.9 05/04/2017     05/04/2017    GLU 94 05/04/2017    BUN 7 05/04/2017     05/04/2017    K 4.8 05/04/2017     05/04/2017        SEDATION PLAN: per anesthesia      History reviewed, vital signs satisfactory, cardiopulmonary status satisfactory, sedation options, risks and plans have been discussed with the patient  All their questions were answered and the patient agrees to the sedation procedures as planned and the patient is deemed an appropriate candidate for the sedation as planned.    Procedure explained to patient, informed consent obtained and placed in chart.    Krista Tolbert  10/12/2019  9:45 AM

## 2019-10-12 NOTE — DISCHARGE SUMMARY
Endoscopy Discharge Summary      Admit Date: 10/12/2019    Discharge Date and Time:  10/12/2019 10:17 AM    Attending Physician: Arnulfo Mcgovern MD     Discharge Physician: Arnulfo Mcgovern MD     Principal Admitting Diagnoses: Colon cancer screening         Discharge Diagnosis: The encounter diagnosis was Colon cancer screening.     Discharged Condition: Good    Indication for Admission: Colon cancer screening     Hospital Course: Patient was admitted for an inpatient procedure and tolerated the procedure well with no complications.    Significant Diagnostic Studies: Colonoscopy with polypectomy    Pathology (if any):  Specimen (12h ago, onward)     Start     Ordered    10/12/19 0955  Specimen to Pathology - Surgery  Once     Comments:  #1 Ascending Colon Polyp#2 Cecal Polyp      10/12/19 1014                Estimated Blood Loss: 1 ml.    Discussed with: patient.    Disposition: Home.    Follow Up/Patient Instructions:   Current Discharge Medication List      CONTINUE these medications which have NOT CHANGED    Details   albuterol (VENTOLIN HFA) 90 mcg/actuation inhaler Inhale 2 puffs into the lungs every 6 (six) hours as needed for Wheezing.  Qty: 54 g, Refills: 12      hydroxocobalamin 1,000 mcg/mL Soln       L-METHYLFOLATE 15 mg Tab       progesterone (PROMETRIUM) 200 MG capsule       VITAMIN B-6 50 MG tablet       VITAMIN D2 50,000 unit capsule       ADVAIR DISKUS 100-50 mcg/dose diskus inhaler USE 1 INHALATION TWO TIMES A DAY  Qty: 60 each, Refills: 10      alprazolam (XANAX) 0.25 MG tablet Take 1 tablet (0.25 mg total) by mouth 2 (two) times daily.  Qty: 60 tablet, Refills: 5      famciclovir (FAMVIR) 500 MG tablet TAKE ONE TABLET BY MOUTH EVERY 8 HOURS -IMPORTANT: FINISH ALL OF THISMEDICINE UNLESS OTHERWISE DIRECTED BY YOUR DOCTOR.  Qty: 30 tablet, Refills: 11    Comments: This prescription was filled on 10/17/2017. Any refills authorized will be placed on file.             Discharge Procedure Orders   Diet  general       Follow-up Information     Krista Tolbert MD.    Specialty:  Gastroenterology  Why:  For pathology results  Contact information:  [address] [facility] [place] [address] [phone]

## 2019-10-12 NOTE — TRANSFER OF CARE
"Anesthesia Transfer of Care Note    Patient: Christina Bunn    Procedure(s) Performed: Procedure(s) (LRB):  COLONOSCOPY (N/A)    Patient location: GI    Anesthesia Type: MAC    Transport from OR: Transported from OR on room air with adequate spontaneous ventilation    Post pain: adequate analgesia    Post assessment: no apparent anesthetic complications    Post vital signs: stable    Level of consciousness: responds to stimulation    Nausea/Vomiting: no nausea/vomiting    Complications: none    Transfer of care protocol was followed      Last vitals:   Visit Vitals  BP (!) 149/65 (BP Location: Left arm, Patient Position: Lying)   Pulse 71   Temp 36.9 °C (98.4 °F) (Temporal)   Resp 18   Ht 5' 7" (1.702 m)   Wt 82 kg (180 lb 12.4 oz)   LMP 01/28/2015 (Approximate)   SpO2 100%   Breastfeeding? No   BMI 28.31 kg/m²     "

## 2019-10-12 NOTE — DISCHARGE INSTRUCTIONS
Understanding Colon and Rectal Polyps    The colon (also called the large intestine) is a muscular tube that forms the last part of the digestive tract. It absorbs water and stores food waste. The colon is about 4 to 6 feet long. The rectum is the last 6 inches of the colon. The colon and rectum have a smooth lining composed of millions of cells. Changes in these cells can lead to growths in the colon that can become cancerous and should be removed. Multiple tests are available to screen for colon cancer, but the colonoscopy is the most recommended test. During colonoscopy, these polyps can be removed. How often you need this test depends on many things including your condition, your family history, symptoms, and what the findings were at the previous colonoscopy.   When the colon lining changes  Changes that happen in the cells that line the colon or rectum can lead to growths called polyps. Over a period of years, polyps can turn cancerous. Removing polyps early may prevent cancer from ever forming.  Polyps  Polyps are fleshy clumps of tissue that form on the lining of the colon or rectum. Small polyps are usually benign (not cancerous). However, over time, cells in a polyp can change and become cancerous. Certain types of polyps known as adenomatous polyps are premalignant. The risk for invasive cancer increases with the size of the polyp and certain cell and gene features. This means that they can become cancerous if they're not removed. Hyperplastic polyps are benign. They can grow quite large and not turn cancerous.   Cancer  Almost all colorectal cancers start when polyp cells begin growing abnormally. As a cancerous tumor grows, it may involve more and more of the colon or rectum. In time, cancer can also grow beyond the colon or rectum and spread to nearby organs or to glands called lymph nodes. The cells can also travel to other parts of the body. This is known as metastasis. The earlier a cancerous  tumor is removed, the better the chance of preventing its spread.    Date Last Reviewed: 8/1/2016  © 0401-5426 [employer]. [address] [place] [address], [place] [address]. All rights reserved. This information is not intended as a substitute for professional medical care. Always follow your healthcare professional's instructions.        Colonoscopy     A camera attached to a flexible tube with a viewing lens is used to take video pictures.     Colonoscopy is a test to view the inside of your lower digestive tract (colon and rectum). Sometimes it can show the last part of the small intestine (ileum). During the test, small pieces of tissue may be removed for testing. This is called a biopsy. Small growths, such as polyps, may also be removed.   Why is colonoscopy done?  The test is done to help look for colon cancer. And it can help find the source of abdominal pain, bleeding, and changes in bowel habits. It may be needed once a year, depending on factors such as your:  · Age  · Health history  · Family health history  · Symptoms  · Results from any prior colonoscopy  Risks and possible complications  These include:  · Bleeding               · A puncture or tear in the colon   · Risks of anesthesia  · A cancer lesion not being seen  Getting ready   To prepare for the test:  · Talk with your healthcare provider about the risks of the test (see below). Also ask your healthcare provider about alternatives to the test.  · Tell your healthcare provider about any medicines you take. Also tell him or her about any health conditions you may have.  · Make sure your rectum and colon are empty for the test. Follow the diet and bowel prep instructions exactly. If you dont, the test may need to be rescheduled.  · Plan for a friend or family member to drive you home after the test.     Colonoscopy provides an inside view of the entire colon.     You may discuss the results with your doctor right away or at a future  visit.  During the test   The test is usually done in the hospital on an outpatient basis. This means you go home the same day. The procedure takes about 30 minutes. During that time:  · You are given relaxing (sedating) medicine through an IV line. You may be drowsy, or fully asleep.  · The healthcare provider will first give you a physical exam to check for anal and rectal problems.  · Then the anus is lubricated and the scope inserted.  · If you are awake, you may have a feeling similar to needing to have a bowel movement. You may also feel pressure as air is pumped into the colon. Its OK to pass gas during the procedure.  · Biopsy, polyp removal, or other treatments may be done during the test.  After the test   You may have gas right after the test. It can help to try to pass it to help prevent later bloating. Your healthcare provider may discuss the results with you right away. Or you may need to schedule a follow-up visit to talk about the results. After the test, you can go back to your normal eating and other activities. You may be tired from the sedation and need to rest for a few hours.  Date Last Reviewed: 11/1/2016  © 2188-0225 [employer]. [address] [place] [address], [place] [address]. All rights reserved. This information is not intended as a substitute for professional medical care. Always follow your healthcare professional's instructions.

## 2019-10-31 ENCOUNTER — OFFICE VISIT (OUTPATIENT)
Dept: FAMILY MEDICINE | Facility: CLINIC | Age: 54
End: 2019-10-31
Payer: COMMERCIAL

## 2019-10-31 VITALS
DIASTOLIC BLOOD PRESSURE: 73 MMHG | HEIGHT: 67 IN | HEART RATE: 83 BPM | BODY MASS INDEX: 28.38 KG/M2 | SYSTOLIC BLOOD PRESSURE: 112 MMHG | WEIGHT: 180.81 LBS

## 2019-10-31 DIAGNOSIS — L08.9 FINGER INFECTION: Primary | ICD-10-CM

## 2019-10-31 PROCEDURE — 99213 PR OFFICE/OUTPT VISIT, EST, LEVL III, 20-29 MIN: ICD-10-PCS | Mod: S$GLB,,, | Performed by: NURSE PRACTITIONER

## 2019-10-31 PROCEDURE — 3008F BODY MASS INDEX DOCD: CPT | Mod: CPTII,S$GLB,, | Performed by: NURSE PRACTITIONER

## 2019-10-31 PROCEDURE — 99999 PR PBB SHADOW E&M-EST. PATIENT-LVL IV: CPT | Mod: PBBFAC,,, | Performed by: NURSE PRACTITIONER

## 2019-10-31 PROCEDURE — 99999 PR PBB SHADOW E&M-EST. PATIENT-LVL IV: ICD-10-PCS | Mod: PBBFAC,,, | Performed by: NURSE PRACTITIONER

## 2019-10-31 PROCEDURE — 3008F PR BODY MASS INDEX (BMI) DOCUMENTED: ICD-10-PCS | Mod: CPTII,S$GLB,, | Performed by: NURSE PRACTITIONER

## 2019-10-31 PROCEDURE — 99213 OFFICE O/P EST LOW 20 MIN: CPT | Mod: S$GLB,,, | Performed by: NURSE PRACTITIONER

## 2019-10-31 RX ORDER — MUPIROCIN 20 MG/G
OINTMENT TOPICAL 3 TIMES DAILY
Qty: 22 G | Refills: 0 | Status: SHIPPED | OUTPATIENT
Start: 2019-10-31 | End: 2019-11-10

## 2019-10-31 RX ORDER — DOXYCYCLINE HYCLATE 100 MG
100 TABLET ORAL 2 TIMES DAILY
Qty: 20 TABLET | Refills: 0 | Status: SHIPPED | OUTPATIENT
Start: 2019-10-31 | End: 2019-11-10

## 2019-10-31 NOTE — PROGRESS NOTES
Subjective:       Patient ID: Christina Bunn is a 54 y.o. female.    Chief Complaint: finger problem    Hand Pain    The incident occurred more than 1 week ago (Infected cuticle right hand, 4th finger). There was no injury mechanism. The pain is present in the right hand. Quality: tender. The pain does not radiate. The pain is mild. Pertinent negatives include no chest pain, muscle weakness, numbness or tingling. Associated symptoms comments: States spread to 2nd knuckle in past. Nothing aggravates the symptoms. Treatments tried: Cleaning with peroxide. The treatment provided no relief.     Past Medical History:   Diagnosis Date    Asthma      Social History     Socioeconomic History    Marital status:      Spouse name: Not on file    Number of children: Not on file    Years of education: Not on file    Highest education level: Not on file   Occupational History    Not on file   Social Needs    Financial resource strain: Not on file    Food insecurity:     Worry: Not on file     Inability: Not on file    Transportation needs:     Medical: Not on file     Non-medical: Not on file   Tobacco Use    Smoking status: Never Smoker    Smokeless tobacco: Never Used   Substance and Sexual Activity    Alcohol use: Yes     Comment: wine social    Drug use: No    Sexual activity: Yes     Partners: Male     Birth control/protection: Surgical   Lifestyle    Physical activity:     Days per week: Not on file     Minutes per session: Not on file    Stress: Not on file   Relationships    Social connections:     Talks on phone: Not on file     Gets together: Not on file     Attends Druze service: Not on file     Active member of club or organization: Not on file     Attends meetings of clubs or organizations: Not on file     Relationship status: Not on file   Other Topics Concern    Not on file   Social History Narrative    Not on file     Past Surgical History:   Procedure Laterality Date     APPENDECTOMY       SECTION      COLONOSCOPY N/A 10/12/2019    Procedure: COLONOSCOPY;  Surgeon: Krista Tolbert MD;  Location[facility];  Service: Endoscopy;  Laterality: N/A;    ENDOMETRIAL ABLATION  2013    OOPHORECTOMY      one ovary removed    TONSILLECTOMY      TUBAL LIGATION         Review of Systems   Constitutional: Negative.  Negative for activity change and unexpected weight change.   HENT: Negative.  Negative for hearing loss, rhinorrhea and trouble swallowing.    Eyes: Negative.  Negative for discharge and visual disturbance.   Respiratory: Negative.  Negative for chest tightness and wheezing.    Cardiovascular: Negative.  Negative for chest pain and palpitations.   Gastrointestinal: Negative.  Negative for blood in stool, constipation, diarrhea and vomiting.   Endocrine: Negative.  Negative for polydipsia and polyuria.   Genitourinary: Negative.  Negative for difficulty urinating, dysuria, hematuria and menstrual problem.   Musculoskeletal: Negative for arthralgias, joint swelling and neck pain.        Finger infection   Skin: Negative.    Allergic/Immunologic: Negative.    Neurological: Negative.  Negative for tingling, weakness, numbness and headaches.   Psychiatric/Behavioral: Negative.  Negative for confusion and dysphoric mood.       Objective:      Physical Exam   Constitutional: She is oriented to person, place, and time. She appears well-developed and well-nourished.   HENT:   Head: Normocephalic.   Right Ear: External ear normal.   Left Ear: External ear normal.   Nose: Nose normal.   Mouth/Throat: Oropharynx is clear and moist.   Eyes: Pupils are equal, round, and reactive to light. Conjunctivae are normal.   Neck: Normal range of motion. Neck supple.   Cardiovascular: Normal rate, regular rhythm and normal heart sounds.   Pulmonary/Chest: Effort normal and breath sounds normal.   Abdominal: Soft. Bowel sounds are normal.   Musculoskeletal: Normal range of motion.         Hands:  Neurological: She is alert and oriented to person, place, and time.   Skin: Skin is warm and dry. Capillary refill takes 2 to 3 seconds.   Psychiatric: She has a normal mood and affect. Her behavior is normal. Judgment and thought content normal.   Nursing note and vitals reviewed.      Assessment:       1. Finger infection        Plan:       Christina was seen today for finger problem.    Diagnoses and all orders for this visit:    Finger infection  -     mupirocin (BACTROBAN) 2 % ointment; Apply topically 3 (three) times daily. for 10 days  -     doxycycline (VIBRA-TABS) 100 MG tablet; Take 1 tablet (100 mg total) by mouth 2 (two) times daily. for 10 days  Keep clean and dry  Clean with antibacterial soap and water   Consider hand specialist/surgeon if symptoms persist  Report to ER immediately if symptoms worsen

## 2020-02-24 RX ORDER — ALBUTEROL SULFATE 90 UG/1
AEROSOL, METERED RESPIRATORY (INHALATION)
Qty: 54 G | Refills: 3 | Status: SHIPPED | OUTPATIENT
Start: 2020-02-24 | End: 2021-08-16

## 2020-02-26 ENCOUNTER — TELEPHONE (OUTPATIENT)
Dept: FAMILY MEDICINE | Facility: CLINIC | Age: 55
End: 2020-02-26

## 2020-02-26 ENCOUNTER — HOSPITAL ENCOUNTER (OUTPATIENT)
Dept: RADIOLOGY | Facility: HOSPITAL | Age: 55
Discharge: HOME | End: 2020-02-26
Attending: NURSE PRACTITIONER
Payer: COMMERCIAL

## 2020-02-26 ENCOUNTER — OFFICE VISIT (OUTPATIENT)
Dept: FAMILY MEDICINE | Facility: CLINIC | Age: 55
End: 2020-02-26
Payer: COMMERCIAL

## 2020-02-26 VITALS
WEIGHT: 186.38 LBS | HEIGHT: 67 IN | BODY MASS INDEX: 29.25 KG/M2 | TEMPERATURE: 98 F | HEART RATE: 89 BPM | SYSTOLIC BLOOD PRESSURE: 108 MMHG | DIASTOLIC BLOOD PRESSURE: 65 MMHG

## 2020-02-26 DIAGNOSIS — M25.512 LEFT SHOULDER PAIN, UNSPECIFIED CHRONICITY: Primary | ICD-10-CM

## 2020-02-26 DIAGNOSIS — Z91.038 HISTORY OF INSECT STING ALLERGY: ICD-10-CM

## 2020-02-26 DIAGNOSIS — M25.512 LEFT SHOULDER PAIN, UNSPECIFIED CHRONICITY: ICD-10-CM

## 2020-02-26 DIAGNOSIS — R22.30 MASS OF SHOULDER REGION: ICD-10-CM

## 2020-02-26 DIAGNOSIS — Z76.0 MEDICATION REFILL: ICD-10-CM

## 2020-02-26 PROCEDURE — 73010 X-RAY EXAM OF SHOULDER BLADE: CPT | Mod: 26,LT,, | Performed by: RADIOLOGY

## 2020-02-26 PROCEDURE — 99999 PR PBB SHADOW E&M-EST. PATIENT-LVL V: CPT | Mod: PBBFAC,,, | Performed by: NURSE PRACTITIONER

## 2020-02-26 PROCEDURE — 73030 X-RAY EXAM OF SHOULDER: CPT | Mod: 26,LT,, | Performed by: RADIOLOGY

## 2020-02-26 PROCEDURE — 73030 X-RAY EXAM OF SHOULDER: CPT | Mod: TC,PO,LT

## 2020-02-26 PROCEDURE — 99214 OFFICE O/P EST MOD 30 MIN: CPT | Mod: S$GLB,,, | Performed by: NURSE PRACTITIONER

## 2020-02-26 PROCEDURE — 99214 PR OFFICE/OUTPT VISIT, EST, LEVL IV, 30-39 MIN: ICD-10-PCS | Mod: S$GLB,,, | Performed by: NURSE PRACTITIONER

## 2020-02-26 PROCEDURE — 73010 XR SCAPULA LEFT: ICD-10-PCS | Mod: 26,LT,, | Performed by: RADIOLOGY

## 2020-02-26 PROCEDURE — 3008F BODY MASS INDEX DOCD: CPT | Mod: CPTII,S$GLB,, | Performed by: NURSE PRACTITIONER

## 2020-02-26 PROCEDURE — 3008F PR BODY MASS INDEX (BMI) DOCUMENTED: ICD-10-PCS | Mod: CPTII,S$GLB,, | Performed by: NURSE PRACTITIONER

## 2020-02-26 PROCEDURE — 73030 XR SHOULDER COMPLETE 2 OR MORE VIEWS LEFT: ICD-10-PCS | Mod: 26,LT,, | Performed by: RADIOLOGY

## 2020-02-26 PROCEDURE — 73010 X-RAY EXAM OF SHOULDER BLADE: CPT | Mod: TC,PO,LT

## 2020-02-26 PROCEDURE — 99999 PR PBB SHADOW E&M-EST. PATIENT-LVL V: ICD-10-PCS | Mod: PBBFAC,,, | Performed by: NURSE PRACTITIONER

## 2020-02-26 RX ORDER — FLUTICASONE PROPIONATE AND SALMETEROL 100; 50 UG/1; UG/1
POWDER RESPIRATORY (INHALATION)
Qty: 60 EACH | Refills: 0 | Status: SHIPPED | OUTPATIENT
Start: 2020-02-26 | End: 2021-10-12 | Stop reason: SDUPTHER

## 2020-02-26 RX ORDER — EPINEPHRINE 0.3 MG/.3ML
1 INJECTION SUBCUTANEOUS ONCE
Qty: 2 EACH | Refills: 0 | Status: SHIPPED | OUTPATIENT
Start: 2020-02-26 | End: 2020-03-02 | Stop reason: SDUPTHER

## 2020-02-26 NOTE — PROGRESS NOTES
"Subjective:       Patient ID: Christina Bunn is a 54 y.o. female.    Chief Complaint: Shoulder Pain (knot under left shoulder for 1 year)    Shoulder Pain    The pain is present in the left shoulder (States has "knot"). This is a chronic problem. The current episode started more than 1 year ago. There has been no history of extremity trauma. The problem occurs daily. The pain is at a severity of 1/10. The pain is mild. Pertinent negatives include no fever, headaches, inability to bear weight, itching, joint locking, joint swelling, limited range of motion, numbness, stiffness, tingling or visual symptoms. Treatments tried: massage therapy. The treatment provided no relief. Family history does not include arthritis. There is no history of diabetes, Injuries to Extremity or migraines.   Wheezing    This is a recurrent (Pt has asthma; has not been using advair inhaler) problem. The current episode started more than 1 year ago. The problem occurs daily. The problem has been unchanged. Pertinent negatives include no abdominal pain, chest pain, chills, coryza, coughing, diarrhea, ear pain, fever, headaches, hemoptysis, neck pain, rash, rhinorrhea, shortness of breath, sore throat, sputum production, swollen glands or vomiting. Nothing aggravates the symptoms. She has tried nothing for the symptoms. The treatment provided no relief. Her past medical history is significant for asthma. There is no history of bronchiolitis, CAD, chronic lung disease, COPD, DVT, heart failure, past MI, PE or pneumonia.   Pt also requests epipen; states has allergy to insect stings and is currently caring for bees/beehives.   Past Medical History:   Diagnosis Date    Asthma      Social History     Socioeconomic History    Marital status:      Spouse name: Not on file    Number of children: Not on file    Years of education: Not on file    Highest education level: Not on file   Occupational History    Not on file   Social Needs "    Financial resource strain: Not on file    Food insecurity:     Worry: Not on file     Inability: Not on file    Transportation needs:     Medical: Not on file     Non-medical: Not on file   Tobacco Use    Smoking status: Never Smoker    Smokeless tobacco: Never Used   Substance and Sexual Activity    Alcohol use: Yes     Comment: wine social    Drug use: No    Sexual activity: Yes     Partners: Male     Birth control/protection: Surgical   Lifestyle    Physical activity:     Days per week: Not on file     Minutes per session: Not on file    Stress: Not on file   Relationships    Social connections:     Talks on phone: Not on file     Gets together: Not on file     Attends Orthodoxy service: Not on file     Active member of club or organization: Not on file     Attends meetings of clubs or organizations: Not on file     Relationship status: Not on file   Other Topics Concern    Not on file   Social History Narrative    Not on file     Past Surgical History:   Procedure Laterality Date    APPENDECTOMY       SECTION      COLONOSCOPY N/A 10/12/2019    Procedure: COLONOSCOPY;  Surgeon: Krista Tolbert MD;  [facility];  Service: Endoscopy;  Laterality: N/A;    ENDOMETRIAL ABLATION      OOPHORECTOMY      one ovary removed    TONSILLECTOMY      TUBAL LIGATION         Review of Systems   Constitutional: Negative.  Negative for activity change, chills, fever and unexpected weight change.   HENT: Negative.  Negative for ear pain, hearing loss, rhinorrhea, sore throat and trouble swallowing.    Eyes: Negative.  Negative for discharge and visual disturbance.   Respiratory: Positive for wheezing. Negative for cough, hemoptysis, sputum production, chest tightness and shortness of breath.    Cardiovascular: Negative.  Negative for chest pain and palpitations.   Gastrointestinal: Negative.  Negative for abdominal pain, blood in stool, constipation, diarrhea and vomiting.   Endocrine:  Negative.  Negative for polydipsia and polyuria.   Genitourinary: Negative.  Negative for difficulty urinating, dysuria, hematuria and menstrual problem.   Musculoskeletal: Positive for arthralgias (left shoulder). Negative for joint swelling, neck pain and stiffness.   Skin: Negative.  Negative for itching and rash.   Allergic/Immunologic: Negative.    Neurological: Negative.  Negative for tingling, weakness, numbness and headaches.   Psychiatric/Behavioral: Negative.  Negative for confusion and dysphoric mood.       Objective:      Physical Exam   Constitutional: She is oriented to person, place, and time. She appears well-developed and well-nourished.   HENT:   Head: Normocephalic.   Right Ear: External ear normal.   Left Ear: External ear normal.   Nose: Nose normal.   Mouth/Throat: Oropharynx is clear and moist.   Eyes: Pupils are equal, round, and reactive to light. Conjunctivae are normal.   Neck: Normal range of motion. Neck supple.   Cardiovascular: Normal rate, regular rhythm and normal heart sounds.   Pulmonary/Chest: Effort normal and breath sounds normal.   Abdominal: Soft. Bowel sounds are normal.   Musculoskeletal: Normal range of motion.   Neurological: She is alert and oriented to person, place, and time.   Skin: Skin is warm and dry. Capillary refill takes 2 to 3 seconds.   Psychiatric: She has a normal mood and affect. Her behavior is normal. Judgment and thought content normal.   Nursing note and vitals reviewed.      Assessment:       1. Left shoulder pain, unspecified chronicity    2. Mass of shoulder region    3. Medication refill    4.      History of insect sting allergy  Plan:           Christina was seen today for shoulder pain.    Diagnoses and all orders for this visit:    Left shoulder pain, unspecified chronicity  -     US Soft Tissue Chest_Upper Back; Future  -     X-Ray Shoulder 2 or More Views Left; Future  -     X-Ray Scapula Left; Future    Mass of shoulder region  -     US Soft Tissue  Chest_Upper Back; Future    Medication refill  -     fluticasone-salmeterol diskus inhaler 100-50 mcg; USE 1 INHALATION TWO TIMES A DAY    History of insect sting allergy  -     EPINEPHrine (EPIPEN 2-MAR) 0.3 mg/0.3 mL AtIn; Inject 0.3 mLs (0.3 mg total) into the muscle once. for 1 dose

## 2020-02-26 NOTE — TELEPHONE ENCOUNTER
----- Message from Tory Valencia sent at 2/26/2020  2:55 PM CST -----  Contact: pt  Type:  Patient Returning Call    Who Called: the pt   Who Left Message for Patient: unknown  Does the patient know what this is regarding?: Results  Would the patient rather a call back or a response via [facility]? Call back  Best Call Back Number: [phone]  Additional Information: n/a

## 2020-02-26 NOTE — TELEPHONE ENCOUNTER
----- Message from Charity Dahl sent at 2/26/2020  3:15 PM CST -----  Contact: pt  .Type:  Patient Returning Call    Who Called: pt  Who Left Message for Patient:   Does the patient know what this is regarding?:   Would the patient rather a call back or a response via [facility]? Call back   Best Call Back Number: [phone]  Additional Information:

## 2020-03-01 ENCOUNTER — PATIENT MESSAGE (OUTPATIENT)
Dept: FAMILY MEDICINE | Facility: CLINIC | Age: 55
End: 2020-03-01

## 2020-03-02 ENCOUNTER — PATIENT MESSAGE (OUTPATIENT)
Dept: FAMILY MEDICINE | Facility: CLINIC | Age: 55
End: 2020-03-02

## 2020-03-02 RX ORDER — EPINEPHRINE 0.3 MG/.3ML
1 INJECTION SUBCUTANEOUS ONCE
Qty: 2 EACH | Refills: 0 | Status: SHIPPED | OUTPATIENT
Start: 2020-03-02 | End: 2021-10-12 | Stop reason: SDUPTHER

## 2020-03-02 RX ORDER — ALPRAZOLAM 0.25 MG/1
0.25 TABLET ORAL 3 TIMES DAILY PRN
Qty: 90 TABLET | Refills: 1 | Status: SHIPPED | OUTPATIENT
Start: 2020-03-02 | End: 2021-10-12 | Stop reason: SDUPTHER

## 2020-03-02 NOTE — TELEPHONE ENCOUNTER
I have not seen this patient for this medication.  Please have them establish care with me or have Dr. Moss fill if appropriate.

## 2020-03-02 NOTE — TELEPHONE ENCOUNTER
Called and left a message asking patient to call the clinic back to schedule and appointment with Dr. Thomas

## 2021-04-29 ENCOUNTER — PATIENT MESSAGE (OUTPATIENT)
Dept: RESEARCH | Facility: HOSPITAL | Age: 56
End: 2021-04-29

## 2021-08-16 RX ORDER — ALBUTEROL SULFATE 90 UG/1
AEROSOL, METERED RESPIRATORY (INHALATION)
Qty: 54 G | Refills: 1 | Status: SHIPPED | OUTPATIENT
Start: 2021-08-16 | End: 2021-08-18

## 2021-08-18 ENCOUNTER — PATIENT MESSAGE (OUTPATIENT)
Dept: FAMILY MEDICINE | Facility: CLINIC | Age: 56
End: 2021-08-18

## 2021-08-18 ENCOUNTER — TELEPHONE (OUTPATIENT)
Dept: FAMILY MEDICINE | Facility: CLINIC | Age: 56
End: 2021-08-18

## 2021-08-18 DIAGNOSIS — Z79.899 ENCOUNTER FOR LONG-TERM (CURRENT) USE OF MEDICATIONS: Primary | ICD-10-CM

## 2021-08-18 RX ORDER — ALBUTEROL SULFATE 90 UG/1
2 AEROSOL, METERED RESPIRATORY (INHALATION) EVERY 6 HOURS PRN
Qty: 54 G | Refills: 0 | Status: SHIPPED | OUTPATIENT
Start: 2021-08-18

## 2021-08-18 RX ORDER — FAMCICLOVIR 500 MG/1
TABLET ORAL
Qty: 30 TABLET | Refills: 11 | OUTPATIENT
Start: 2021-08-18

## 2021-08-18 RX ORDER — ALPRAZOLAM 0.25 MG/1
0.25 TABLET ORAL 3 TIMES DAILY PRN
Qty: 90 TABLET | Refills: 1 | OUTPATIENT
Start: 2021-08-18 | End: 2021-09-17

## 2021-08-18 RX ORDER — FLUTICASONE PROPIONATE AND SALMETEROL 100; 50 UG/1; UG/1
POWDER RESPIRATORY (INHALATION)
Qty: 60 EACH | Refills: 0 | OUTPATIENT
Start: 2021-08-18

## 2021-10-12 ENCOUNTER — OFFICE VISIT (OUTPATIENT)
Dept: FAMILY MEDICINE | Facility: CLINIC | Age: 56
End: 2021-10-12
Payer: COMMERCIAL

## 2021-10-12 VITALS
OXYGEN SATURATION: 99 % | TEMPERATURE: 98 F | DIASTOLIC BLOOD PRESSURE: 72 MMHG | WEIGHT: 188.13 LBS | SYSTOLIC BLOOD PRESSURE: 119 MMHG | HEART RATE: 94 BPM | HEIGHT: 67 IN | BODY MASS INDEX: 29.53 KG/M2

## 2021-10-12 DIAGNOSIS — Z13.6 ENCOUNTER FOR LIPID SCREENING FOR CARDIOVASCULAR DISEASE: ICD-10-CM

## 2021-10-12 DIAGNOSIS — Z79.899 ENCOUNTER FOR LONG-TERM (CURRENT) USE OF MEDICATIONS: ICD-10-CM

## 2021-10-12 DIAGNOSIS — J45.20 MILD INTERMITTENT ASTHMA WITHOUT COMPLICATION: ICD-10-CM

## 2021-10-12 DIAGNOSIS — Z12.31 ENCOUNTER FOR SCREENING MAMMOGRAM FOR BREAST CANCER: ICD-10-CM

## 2021-10-12 DIAGNOSIS — G47.00 INSOMNIA, UNSPECIFIED TYPE: ICD-10-CM

## 2021-10-12 DIAGNOSIS — Z13.220 ENCOUNTER FOR LIPID SCREENING FOR CARDIOVASCULAR DISEASE: ICD-10-CM

## 2021-10-12 DIAGNOSIS — Z00.00 WELL ADULT EXAM: Primary | ICD-10-CM

## 2021-10-12 PROCEDURE — 1159F MED LIST DOCD IN RCRD: CPT | Mod: CPTII,S$GLB,, | Performed by: FAMILY MEDICINE

## 2021-10-12 PROCEDURE — 1159F PR MEDICATION LIST DOCUMENTED IN MEDICAL RECORD: ICD-10-PCS | Mod: CPTII,S$GLB,, | Performed by: FAMILY MEDICINE

## 2021-10-12 PROCEDURE — 3008F BODY MASS INDEX DOCD: CPT | Mod: CPTII,S$GLB,, | Performed by: FAMILY MEDICINE

## 2021-10-12 PROCEDURE — 3074F SYST BP LT 130 MM HG: CPT | Mod: CPTII,S$GLB,, | Performed by: FAMILY MEDICINE

## 2021-10-12 PROCEDURE — 3074F PR MOST RECENT SYSTOLIC BLOOD PRESSURE < 130 MM HG: ICD-10-PCS | Mod: CPTII,S$GLB,, | Performed by: FAMILY MEDICINE

## 2021-10-12 PROCEDURE — 99999 PR PBB SHADOW E&M-EST. PATIENT-LVL IV: ICD-10-PCS | Mod: PBBFAC,,, | Performed by: FAMILY MEDICINE

## 2021-10-12 PROCEDURE — 3008F PR BODY MASS INDEX (BMI) DOCUMENTED: ICD-10-PCS | Mod: CPTII,S$GLB,, | Performed by: FAMILY MEDICINE

## 2021-10-12 PROCEDURE — 1160F RVW MEDS BY RX/DR IN RCRD: CPT | Mod: CPTII,S$GLB,, | Performed by: FAMILY MEDICINE

## 2021-10-12 PROCEDURE — 3078F PR MOST RECENT DIASTOLIC BLOOD PRESSURE < 80 MM HG: ICD-10-PCS | Mod: CPTII,S$GLB,, | Performed by: FAMILY MEDICINE

## 2021-10-12 PROCEDURE — 99396 PR PREVENTIVE VISIT,EST,40-64: ICD-10-PCS | Mod: S$GLB,,, | Performed by: FAMILY MEDICINE

## 2021-10-12 PROCEDURE — 3078F DIAST BP <80 MM HG: CPT | Mod: CPTII,S$GLB,, | Performed by: FAMILY MEDICINE

## 2021-10-12 PROCEDURE — 99999 PR PBB SHADOW E&M-EST. PATIENT-LVL IV: CPT | Mod: PBBFAC,,, | Performed by: FAMILY MEDICINE

## 2021-10-12 PROCEDURE — 99396 PREV VISIT EST AGE 40-64: CPT | Mod: S$GLB,,, | Performed by: FAMILY MEDICINE

## 2021-10-12 PROCEDURE — 1160F PR REVIEW ALL MEDS BY PRESCRIBER/CLIN PHARMACIST DOCUMENTED: ICD-10-PCS | Mod: CPTII,S$GLB,, | Performed by: FAMILY MEDICINE

## 2021-10-12 RX ORDER — FLUTICASONE PROPIONATE AND SALMETEROL 100; 50 UG/1; UG/1
POWDER RESPIRATORY (INHALATION)
Qty: 60 EACH | Refills: 0 | Status: SHIPPED | OUTPATIENT
Start: 2021-10-12 | End: 2022-01-04

## 2021-10-12 RX ORDER — TRAZODONE HYDROCHLORIDE 50 MG/1
50 TABLET ORAL NIGHTLY
Qty: 30 TABLET | Refills: 12 | Status: SHIPPED | OUTPATIENT
Start: 2021-10-12 | End: 2022-11-09

## 2021-10-12 RX ORDER — EPINEPHRINE 0.3 MG/.3ML
1 INJECTION SUBCUTANEOUS ONCE
Qty: 2 EACH | Refills: 0 | Status: SHIPPED | OUTPATIENT
Start: 2021-10-12 | End: 2021-10-13 | Stop reason: SDUPTHER

## 2021-10-12 RX ORDER — FAMCICLOVIR 500 MG/1
TABLET ORAL
Qty: 30 TABLET | Refills: 12 | Status: SHIPPED | OUTPATIENT
Start: 2021-10-12 | End: 2024-01-25 | Stop reason: SDUPTHER

## 2021-10-12 RX ORDER — PROGESTERONE 100 MG/1
CAPSULE ORAL
COMMUNITY
Start: 2021-09-21

## 2021-10-12 RX ORDER — ALPRAZOLAM 0.25 MG/1
0.25 TABLET ORAL 3 TIMES DAILY PRN
Qty: 90 TABLET | Refills: 0 | Status: SHIPPED | OUTPATIENT
Start: 2021-10-12 | End: 2021-11-11

## 2021-10-13 ENCOUNTER — LAB VISIT (OUTPATIENT)
Dept: LAB | Facility: HOSPITAL | Age: 56
End: 2021-10-13
Attending: FAMILY MEDICINE
Payer: COMMERCIAL

## 2021-10-13 ENCOUNTER — PATIENT MESSAGE (OUTPATIENT)
Dept: FAMILY MEDICINE | Facility: CLINIC | Age: 56
End: 2021-10-13

## 2021-10-13 DIAGNOSIS — Z13.220 ENCOUNTER FOR LIPID SCREENING FOR CARDIOVASCULAR DISEASE: ICD-10-CM

## 2021-10-13 DIAGNOSIS — Z13.6 ENCOUNTER FOR LIPID SCREENING FOR CARDIOVASCULAR DISEASE: ICD-10-CM

## 2021-10-13 DIAGNOSIS — Z79.899 ENCOUNTER FOR LONG-TERM (CURRENT) USE OF MEDICATIONS: ICD-10-CM

## 2021-10-13 DIAGNOSIS — Z00.00 WELL ADULT EXAM: ICD-10-CM

## 2021-10-13 LAB
ALBUMIN SERPL BCP-MCNC: 4.2 G/DL (ref 3.5–5.2)
ALP SERPL-CCNC: 85 U/L (ref 55–135)
ALT SERPL W/O P-5'-P-CCNC: 22 U/L (ref 10–44)
ANION GAP SERPL CALC-SCNC: 16 MMOL/L (ref 8–16)
AST SERPL-CCNC: 22 U/L (ref 10–40)
BILIRUB SERPL-MCNC: 0.4 MG/DL (ref 0.1–1)
BUN SERPL-MCNC: 13 MG/DL (ref 6–20)
CALCIUM SERPL-MCNC: 10.1 MG/DL (ref 8.7–10.5)
CHLORIDE SERPL-SCNC: 108 MMOL/L (ref 95–110)
CHOLEST SERPL-MCNC: 223 MG/DL (ref 120–199)
CHOLEST/HDLC SERPL: 3.6 {RATIO} (ref 2–5)
CO2 SERPL-SCNC: 19 MMOL/L (ref 23–29)
CREAT SERPL-MCNC: 0.8 MG/DL (ref 0.5–1.4)
ERYTHROCYTE [DISTWIDTH] IN BLOOD BY AUTOMATED COUNT: 13.6 % (ref 11.5–14.5)
EST. GFR  (AFRICAN AMERICAN): >60 ML/MIN/1.73 M^2
EST. GFR  (NON AFRICAN AMERICAN): >60 ML/MIN/1.73 M^2
ESTIMATED AVG GLUCOSE: 103 MG/DL (ref 68–131)
GLUCOSE SERPL-MCNC: 83 MG/DL (ref 70–110)
HBA1C MFR BLD: 5.2 % (ref 4–5.6)
HCT VFR BLD AUTO: 40.3 % (ref 37–48.5)
HDLC SERPL-MCNC: 62 MG/DL (ref 40–75)
HDLC SERPL: 27.8 % (ref 20–50)
HGB BLD-MCNC: 12.8 G/DL (ref 12–16)
LDLC SERPL CALC-MCNC: 146 MG/DL (ref 63–159)
MCH RBC QN AUTO: 30.8 PG (ref 27–31)
MCHC RBC AUTO-ENTMCNC: 31.8 G/DL (ref 32–36)
MCV RBC AUTO: 97 FL (ref 82–98)
NONHDLC SERPL-MCNC: 161 MG/DL
PLATELET # BLD AUTO: 349 K/UL (ref 150–450)
PMV BLD AUTO: 10 FL (ref 9.2–12.9)
POTASSIUM SERPL-SCNC: 4.4 MMOL/L (ref 3.5–5.1)
PROT SERPL-MCNC: 7.7 G/DL (ref 6–8.4)
RBC # BLD AUTO: 4.16 M/UL (ref 4–5.4)
SODIUM SERPL-SCNC: 143 MMOL/L (ref 136–145)
TRIGL SERPL-MCNC: 75 MG/DL (ref 30–150)
TSH SERPL DL<=0.005 MIU/L-ACNC: 1.86 UIU/ML (ref 0.4–4)
WBC # BLD AUTO: 5.89 K/UL (ref 3.9–12.7)

## 2021-10-13 PROCEDURE — 36415 COLL VENOUS BLD VENIPUNCTURE: CPT | Mod: PO | Performed by: FAMILY MEDICINE

## 2021-10-13 PROCEDURE — 85027 COMPLETE CBC AUTOMATED: CPT | Performed by: FAMILY MEDICINE

## 2021-10-13 PROCEDURE — 84443 ASSAY THYROID STIM HORMONE: CPT | Performed by: FAMILY MEDICINE

## 2021-10-13 PROCEDURE — 80061 LIPID PANEL: CPT | Performed by: FAMILY MEDICINE

## 2021-10-13 PROCEDURE — 83036 HEMOGLOBIN GLYCOSYLATED A1C: CPT | Performed by: FAMILY MEDICINE

## 2021-10-13 PROCEDURE — 80053 COMPREHEN METABOLIC PANEL: CPT | Performed by: FAMILY MEDICINE

## 2021-10-13 RX ORDER — EPINEPHRINE 0.3 MG/.3ML
1 INJECTION SUBCUTANEOUS ONCE
Qty: 2 EACH | Refills: 0 | Status: SHIPPED | OUTPATIENT
Start: 2021-10-13 | End: 2021-10-14 | Stop reason: SDUPTHER

## 2021-10-14 ENCOUNTER — PATIENT MESSAGE (OUTPATIENT)
Dept: FAMILY MEDICINE | Facility: CLINIC | Age: 56
End: 2021-10-14

## 2021-10-14 ENCOUNTER — PATIENT MESSAGE (OUTPATIENT)
Dept: FAMILY MEDICINE | Facility: CLINIC | Age: 56
End: 2021-10-14
Payer: COMMERCIAL

## 2021-10-14 DIAGNOSIS — Z79.899 ENCOUNTER FOR LONG-TERM (CURRENT) USE OF MEDICATIONS: ICD-10-CM

## 2021-10-14 RX ORDER — EPINEPHRINE 0.3 MG/.3ML
1 INJECTION SUBCUTANEOUS ONCE
Qty: 2 EACH | Refills: 0 | Status: SHIPPED | OUTPATIENT
Start: 2021-10-14 | End: 2021-10-14

## 2022-05-31 ENCOUNTER — PATIENT MESSAGE (OUTPATIENT)
Dept: FAMILY MEDICINE | Facility: CLINIC | Age: 57
End: 2022-05-31
Payer: COMMERCIAL

## 2022-07-14 NOTE — PATIENT INSTRUCTIONS
Report to ER immediately if symptoms worsen  Keep clean and dry  Clean with antibacterial soap and water   Consider hand specialist/surgeon if symptoms persist   .

## 2022-08-11 ENCOUNTER — PATIENT MESSAGE (OUTPATIENT)
Dept: FAMILY MEDICINE | Facility: CLINIC | Age: 57
End: 2022-08-11
Payer: COMMERCIAL

## 2023-05-08 DIAGNOSIS — J45.20 MILD INTERMITTENT ASTHMA WITHOUT COMPLICATION: ICD-10-CM

## 2023-05-08 RX ORDER — FLUTICASONE PROPIONATE AND SALMETEROL 100; 50 UG/1; UG/1
POWDER RESPIRATORY (INHALATION)
Refills: 3 | OUTPATIENT
Start: 2023-05-08

## 2023-05-08 RX ORDER — FLUTICASONE PROPIONATE AND SALMETEROL 100; 50 UG/1; UG/1
POWDER RESPIRATORY (INHALATION)
Qty: 60 EACH | Refills: 11 | Status: SHIPPED | OUTPATIENT
Start: 2023-05-08

## 2023-05-08 NOTE — TELEPHONE ENCOUNTER
No care due was identified.  [facility] Embedded Care Due Messages. Reference number: [id number].   5/08/2023 1:18:04 PM CDT

## 2023-05-08 NOTE — TELEPHONE ENCOUNTER
Care Due:                  Date            Visit Type   Department     Provider  --------------------------------------------------------------------------------                                EP -                              PRIMARY      [facility]  Last Visit: 10-      CARE ([facility])   MEDICINE       Nathaniel Thomas  Next Visit: None Scheduled  None         None Found                                                            Last  Test          Frequency    Reason                     Performed    Due Date  --------------------------------------------------------------------------------    Office Visit  12 months..  albuterol, famciclovir,    10-   10-                             traZODone................    CBC.........  12 months..  famciclovir..............  10-   10-    Cr..........  12 months..  famciclovir..............  10-   10-    [facility] Embedded Care Due Messages. Reference number: [id number].   5/08/2023 1:05:04 PM CDT

## 2023-06-22 ENCOUNTER — PATIENT MESSAGE (OUTPATIENT)
Dept: FAMILY MEDICINE | Facility: CLINIC | Age: 58
End: 2023-06-22
Payer: COMMERCIAL

## 2023-07-14 DIAGNOSIS — Z79.899 ENCOUNTER FOR LONG-TERM (CURRENT) USE OF MEDICATIONS: ICD-10-CM

## 2023-07-15 NOTE — TELEPHONE ENCOUNTER
Care Due:                  Date            Visit Type   Department     Provider  --------------------------------------------------------------------------------                                EP -                              PRIMARY      [facility] FAMILY  Last Visit: 10-      CARE ([facility])   MEDICINE       Nathaniel Thomas  Next Visit: None Scheduled  None         None Found                                                            Last  Test          Frequency    Reason                     Performed    Due Date  --------------------------------------------------------------------------------    Office Visit  12 months..  albuterol, famciclovir,    10-   10-                             traZODone................    CBC.........  12 months..  famciclovir..............  10-   10-    Cr..........  12 months..  famciclovir..............  10-   10-    [facility] Embedded Care Due Messages. Reference number: [id number].   7/14/2023 8:34:52 PM CDT

## 2023-07-17 RX ORDER — FAMCICLOVIR 500 MG/1
TABLET ORAL
Qty: 30 TABLET | Refills: 12 | OUTPATIENT
Start: 2023-07-17

## 2023-07-17 NOTE — TELEPHONE ENCOUNTER
Patient stated that she has been on this medication for years and she will not come in just to get it filled and she will find another provider.

## 2023-07-17 NOTE — TELEPHONE ENCOUNTER
Patient is due for an appointment.  Please schedule appointment with TARIK for medication  refill.

## 2023-10-04 ENCOUNTER — PATIENT MESSAGE (OUTPATIENT)
Dept: FAMILY MEDICINE | Facility: CLINIC | Age: 58
End: 2023-10-04
Payer: COMMERCIAL

## 2023-10-28 DIAGNOSIS — Z79.899 ENCOUNTER FOR LONG-TERM (CURRENT) USE OF MEDICATIONS: ICD-10-CM

## 2023-10-28 RX ORDER — FAMCICLOVIR 500 MG/1
TABLET ORAL
Qty: 30 TABLET | Refills: 12 | Status: CANCELLED | OUTPATIENT
Start: 2023-10-28

## 2023-10-29 NOTE — TELEPHONE ENCOUNTER
Care Due:                  Date            Visit Type   Department     Provider  --------------------------------------------------------------------------------    Last Visit: None Found      None         None Found  Next Visit: None Scheduled  None         None Found                                                            Last  Test          Frequency    Reason                     Performed    Due Date  --------------------------------------------------------------------------------    Office Visit  15 months..  traZODone................  Not Found    Overdue    [facility] [employer] Embedded Care Due Messages. Reference number: [id number].   10/28/2023 9:02:38 PM CDT

## 2023-10-30 ENCOUNTER — PATIENT MESSAGE (OUTPATIENT)
Dept: FAMILY MEDICINE | Facility: CLINIC | Age: 58
End: 2023-10-30
Payer: COMMERCIAL

## 2023-11-21 ENCOUNTER — HOSPITAL ENCOUNTER (OUTPATIENT)
Dept: RADIOLOGY | Facility: HOSPITAL | Age: 58
Discharge: HOME | End: 2023-11-21
Attending: STUDENT IN AN ORGANIZED HEALTH CARE EDUCATION/TRAINING PROGRAM
Payer: COMMERCIAL

## 2023-11-21 ENCOUNTER — OFFICE VISIT (OUTPATIENT)
Dept: PODIATRY | Facility: CLINIC | Age: 58
End: 2023-11-21
Payer: COMMERCIAL

## 2023-11-21 VITALS — WEIGHT: 188 LBS | HEIGHT: 67 IN | BODY MASS INDEX: 29.51 KG/M2

## 2023-11-21 DIAGNOSIS — M25.572 SINUS TARSITIS OF LEFT FOOT: ICD-10-CM

## 2023-11-21 DIAGNOSIS — M79.672 CHRONIC FOOT PAIN, LEFT: ICD-10-CM

## 2023-11-21 DIAGNOSIS — G89.29 CHRONIC FOOT PAIN, LEFT: Primary | ICD-10-CM

## 2023-11-21 DIAGNOSIS — S86.302S PERONEAL TENDON INJURY, LEFT, SEQUELA: ICD-10-CM

## 2023-11-21 DIAGNOSIS — M79.89 SOFT TISSUE MASS: ICD-10-CM

## 2023-11-21 DIAGNOSIS — G89.29 CHRONIC FOOT PAIN, LEFT: ICD-10-CM

## 2023-11-21 DIAGNOSIS — M79.672 CHRONIC FOOT PAIN, LEFT: Primary | ICD-10-CM

## 2023-11-21 PROCEDURE — 99204 PR OFFICE/OUTPT VISIT, NEW, LEVL IV, 45-59 MIN: ICD-10-PCS | Mod: S$GLB,,, | Performed by: STUDENT IN AN ORGANIZED HEALTH CARE EDUCATION/TRAINING PROGRAM

## 2023-11-21 PROCEDURE — 73630 X-RAY EXAM OF FOOT: CPT | Mod: TC,FY,PO,LT

## 2023-11-21 PROCEDURE — 1160F RVW MEDS BY RX/DR IN RCRD: CPT | Mod: CPTII,S$GLB,, | Performed by: STUDENT IN AN ORGANIZED HEALTH CARE EDUCATION/TRAINING PROGRAM

## 2023-11-21 PROCEDURE — 3008F PR BODY MASS INDEX (BMI) DOCUMENTED: ICD-10-PCS | Mod: CPTII,S$GLB,, | Performed by: STUDENT IN AN ORGANIZED HEALTH CARE EDUCATION/TRAINING PROGRAM

## 2023-11-21 PROCEDURE — 73630 X-RAY EXAM OF FOOT: CPT | Mod: 26,LT,, | Performed by: RADIOLOGY

## 2023-11-21 PROCEDURE — 99999 PR PBB SHADOW E&M-EST. PATIENT-LVL III: CPT | Mod: PBBFAC,,, | Performed by: STUDENT IN AN ORGANIZED HEALTH CARE EDUCATION/TRAINING PROGRAM

## 2023-11-21 PROCEDURE — 99204 OFFICE O/P NEW MOD 45 MIN: CPT | Mod: S$GLB,,, | Performed by: STUDENT IN AN ORGANIZED HEALTH CARE EDUCATION/TRAINING PROGRAM

## 2023-11-21 PROCEDURE — 3008F BODY MASS INDEX DOCD: CPT | Mod: CPTII,S$GLB,, | Performed by: STUDENT IN AN ORGANIZED HEALTH CARE EDUCATION/TRAINING PROGRAM

## 2023-11-21 PROCEDURE — 73630 XR FOOT COMPLETE 3 VIEW LEFT: ICD-10-PCS | Mod: 26,LT,, | Performed by: RADIOLOGY

## 2023-11-21 PROCEDURE — 1159F MED LIST DOCD IN RCRD: CPT | Mod: CPTII,S$GLB,, | Performed by: STUDENT IN AN ORGANIZED HEALTH CARE EDUCATION/TRAINING PROGRAM

## 2023-11-21 PROCEDURE — 1159F PR MEDICATION LIST DOCUMENTED IN MEDICAL RECORD: ICD-10-PCS | Mod: CPTII,S$GLB,, | Performed by: STUDENT IN AN ORGANIZED HEALTH CARE EDUCATION/TRAINING PROGRAM

## 2023-11-21 PROCEDURE — 99999 PR PBB SHADOW E&M-EST. PATIENT-LVL III: ICD-10-PCS | Mod: PBBFAC,,, | Performed by: STUDENT IN AN ORGANIZED HEALTH CARE EDUCATION/TRAINING PROGRAM

## 2023-11-21 PROCEDURE — 1160F PR REVIEW ALL MEDS BY PRESCRIBER/CLIN PHARMACIST DOCUMENTED: ICD-10-PCS | Mod: CPTII,S$GLB,, | Performed by: STUDENT IN AN ORGANIZED HEALTH CARE EDUCATION/TRAINING PROGRAM

## 2023-11-21 NOTE — PROGRESS NOTES
Subjective:      Patient ID: Christina Bunn is a 58 y.o. female.    Chief Complaint: Foot Injury    Ms. Bunn presents today with complaints of chronic left foot pain with activities and also with rest. She reports an injury to the left foot in May of this when she fell down some stairs. She had immediate pain to the lateral midfoot and was unable to walk on the foot for some time. She saw an urgent care where they saw no fractures. She later saw an Orthopedic surgeon in [place] that reportedly saw fractures to the left 5th met base, talus and 4th toe.     Review of Systems   Musculoskeletal:         Left foot pain   All other systems reviewed and are negative.          Objective:      Physical Exam  Cardiovascular:      Pulses:           Dorsalis pedis pulses are 2+ on the left side.        Posterior tibial pulses are 2+ on the left side.   Feet:      Left foot:      Skin integrity: Skin integrity normal.      Toenail Condition: Left toenails are normal.      Comments: Left foot:  Overall rectus foot type. There is tenderness to palpation along the course of the peroneal tendon especially proximal to the 5th met base. There is pain with provocation of the peroneus longus. There is also tenderness to palpation of the sinus tarsi. There is a prominent semifirm mass overlying the distal cuboid or TMTJ with focal edema and pain.              Assessment:       Encounter Diagnoses   Name Primary?    Chronic foot pain, left Yes    Peroneal tendon injury, left, sequela     Sinus tarsitis of left foot     Soft tissue mass          Plan:       Christina was seen today for foot injury.    Diagnoses and all orders for this visit:    Chronic foot pain, left  -     X-Ray Foot Complete Left; Future  -     MRI Foot ([facility]) Left Without Contrast; Future    Peroneal tendon injury, left, sequela  -     MRI Foot ([facility]) Left Without Contrast; Future    Sinus tarsitis of left foot  -     MRI Foot ([facility]) Left Without Contrast;  Future    Soft tissue mass  -     MRI Foot ([facility]) Left Without Contrast; Future      I counseled the patient on her conditions, their implications and medical management.    Xray to rule out fracture.    MRI due to chronic pain and injury.    F/u after the MRI to discuss concrete treatment plan but I did discuss the likely treatment routes including supportive shoes, physical therapy and steroid injections. There is a possibility for surgical repair of the peroneal tendon if it doesn't heal.

## 2023-12-06 ENCOUNTER — HOSPITAL ENCOUNTER (OUTPATIENT)
Dept: RADIOLOGY | Facility: HOSPITAL | Age: 58
Discharge: HOME | End: 2023-12-06
Attending: STUDENT IN AN ORGANIZED HEALTH CARE EDUCATION/TRAINING PROGRAM
Payer: COMMERCIAL

## 2023-12-06 DIAGNOSIS — M25.572 SINUS TARSITIS OF LEFT FOOT: ICD-10-CM

## 2023-12-06 DIAGNOSIS — M79.89 SOFT TISSUE MASS: ICD-10-CM

## 2023-12-06 DIAGNOSIS — S86.302S PERONEAL TENDON INJURY, LEFT, SEQUELA: ICD-10-CM

## 2023-12-06 DIAGNOSIS — G89.29 CHRONIC FOOT PAIN, LEFT: ICD-10-CM

## 2023-12-06 DIAGNOSIS — M79.672 CHRONIC FOOT PAIN, LEFT: ICD-10-CM

## 2023-12-06 PROCEDURE — 73718 MRI FOOT (HINDFOOT) LEFT WITHOUT CONTRAST: ICD-10-PCS | Mod: 26,LT,, | Performed by: RADIOLOGY

## 2023-12-06 PROCEDURE — 73718 MRI LOWER EXTREMITY W/O DYE: CPT | Mod: 26,LT,, | Performed by: RADIOLOGY

## 2023-12-06 PROCEDURE — 73718 MRI LOWER EXTREMITY W/O DYE: CPT | Mod: TC,PO,LT

## 2023-12-07 ENCOUNTER — TELEPHONE (OUTPATIENT)
Dept: PODIATRY | Facility: CLINIC | Age: 58
End: 2023-12-07
Payer: COMMERCIAL

## 2023-12-07 NOTE — TELEPHONE ENCOUNTER
Spoke with pt regarding appointment for 12/8 and rescheduled the appointment for a month from now per Dr. Whitlock to give pt more time in the tall boot for healing.

## 2024-01-11 ENCOUNTER — OFFICE VISIT (OUTPATIENT)
Dept: PODIATRY | Facility: CLINIC | Age: 59
End: 2024-01-11
Payer: COMMERCIAL

## 2024-01-11 VITALS — HEIGHT: 67 IN | BODY MASS INDEX: 29.51 KG/M2 | WEIGHT: 188 LBS

## 2024-01-11 DIAGNOSIS — M79.672 CHRONIC FOOT PAIN, LEFT: ICD-10-CM

## 2024-01-11 DIAGNOSIS — G89.29 CHRONIC FOOT PAIN, LEFT: ICD-10-CM

## 2024-01-11 DIAGNOSIS — S92.025D CLOSED NONDISPLACED FRACTURE OF ANTERIOR PROCESS OF LEFT CALCANEUS WITH ROUTINE HEALING, SUBSEQUENT ENCOUNTER: ICD-10-CM

## 2024-01-11 DIAGNOSIS — M25.572 SINUS TARSITIS OF LEFT FOOT: Primary | ICD-10-CM

## 2024-01-11 DIAGNOSIS — G47.00 INSOMNIA, UNSPECIFIED TYPE: ICD-10-CM

## 2024-01-11 PROCEDURE — 1160F RVW MEDS BY RX/DR IN RCRD: CPT | Mod: CPTII,S$GLB,, | Performed by: STUDENT IN AN ORGANIZED HEALTH CARE EDUCATION/TRAINING PROGRAM

## 2024-01-11 PROCEDURE — 1159F MED LIST DOCD IN RCRD: CPT | Mod: CPTII,S$GLB,, | Performed by: STUDENT IN AN ORGANIZED HEALTH CARE EDUCATION/TRAINING PROGRAM

## 2024-01-11 PROCEDURE — 99999 PR PBB SHADOW E&M-EST. PATIENT-LVL III: CPT | Mod: PBBFAC,,, | Performed by: STUDENT IN AN ORGANIZED HEALTH CARE EDUCATION/TRAINING PROGRAM

## 2024-01-11 PROCEDURE — 99214 OFFICE O/P EST MOD 30 MIN: CPT | Mod: 25,S$GLB,, | Performed by: STUDENT IN AN ORGANIZED HEALTH CARE EDUCATION/TRAINING PROGRAM

## 2024-01-11 PROCEDURE — 3008F BODY MASS INDEX DOCD: CPT | Mod: CPTII,S$GLB,, | Performed by: STUDENT IN AN ORGANIZED HEALTH CARE EDUCATION/TRAINING PROGRAM

## 2024-01-11 PROCEDURE — 20600 DRAIN/INJ JOINT/BURSA W/O US: CPT | Mod: LT,S$GLB,, | Performed by: STUDENT IN AN ORGANIZED HEALTH CARE EDUCATION/TRAINING PROGRAM

## 2024-01-11 RX ORDER — TRIAMCINOLONE ACETONIDE 40 MG/ML
40 INJECTION, SUSPENSION INTRA-ARTICULAR; INTRAMUSCULAR
Status: COMPLETED | OUTPATIENT
Start: 2024-01-11 | End: 2024-01-11

## 2024-01-11 RX ORDER — TRAZODONE HYDROCHLORIDE 50 MG/1
TABLET ORAL
Qty: 90 TABLET | Refills: 0 | OUTPATIENT
Start: 2024-01-11

## 2024-01-11 RX ADMIN — TRIAMCINOLONE ACETONIDE 40 MG: 40 INJECTION, SUSPENSION INTRA-ARTICULAR; INTRAMUSCULAR at 05:01

## 2024-01-11 NOTE — TELEPHONE ENCOUNTER
Care Due:                  Date            Visit Type   Department     Provider  --------------------------------------------------------------------------------    Last Visit: None Found      None         None Found  Next Visit: None Scheduled  None         None Found                                                            Last  Test          Frequency    Reason                     Performed    Due Date  --------------------------------------------------------------------------------    Office Visit  15 months..  traZODone................  Not Found    Overdue    [facility] [employer] Embedded Care Due Messages. Reference number: [id number].   1/11/2024 12:12:23 AM CST

## 2024-01-11 NOTE — PROGRESS NOTES
Subjective:      Patient ID: Christina Bunn is a 58 y.o. female.    Chief Complaint: Follow-up and Foot Pain    Ms. Bunn presents today with complaints of chronic left foot pain with activities and also with rest. She reports an injury to the left foot in May of this when she fell down some stairs. She had immediate pain to the lateral midfoot and was unable to walk on the foot for some time. She saw an urgent care where they saw no fractures. She later saw an Orthopedic surgeon in [place] that reportedly saw fractures to the left 5th met base, talus and 4th toe.     1/11/24:  MRI reviewed with healing calc fracture, sinus tarsi signal, cuboid signal. Pain is improving in the boot.     Review of Systems   Musculoskeletal:         Left foot pain   All other systems reviewed and are negative.          Objective:      Physical Exam  Cardiovascular:      Pulses:           Dorsalis pedis pulses are 2+ on the left side.        Posterior tibial pulses are 2+ on the left side.   Feet:      Left foot:      Skin integrity: Skin integrity normal.      Toenail Condition: Left toenails are normal.      Comments: Left foot:  Overall rectus foot type. There is tenderness to palpation along the course of the peroneal tendon especially proximal to the 5th met base. There is pain with provocation of the peroneus longus. There is also tenderness to palpation of the sinus tarsi. There is a prominent semifirm mass overlying the distal cuboid or TMTJ with focal edema and pain.              Assessment:       Encounter Diagnoses   Name Primary?    Sinus tarsitis of left foot Yes    Closed nondisplaced fracture of anterior process of left calcaneus with routine healing, subsequent encounter     Chronic foot pain, left          Plan:       Christina was seen today for follow-up and foot pain.    Diagnoses and all orders for this visit:    Sinus tarsitis of left foot    Closed nondisplaced fracture of anterior process of left calcaneus with  routine healing, subsequent encounter    Chronic foot pain, left      I counseled the patient on her conditions, their implications and medical management.    Begin to wean from boot.     Steroid inj to the STJ today.    PT for peroneal tendon strengthening.    Simon Whitlock DPM    Small Joint Aspiration/Injection: L subtalar    Date/Time: 1/11/2024 2:20 PM    Performed by: Simon Whitlock DPM  Authorized by: Simon Whitlock DPM    Consent Done?:  Yes (Verbal)  Indications:  Pain  Location:  Foot  Site:  L subtalar  Needle size:  25 G  Approach:  Lateral  Patient tolerance:  Patient tolerated the procedure well with no immediate complications

## 2024-01-11 NOTE — TELEPHONE ENCOUNTER
Refill Routing Note   Medication(s) are not appropriate for processing by [facility] for the following reason(s):        Patient not seen by provider within 15 months    [facility] action(s):  Defer   Requires appointment : Yes             Appointments  past 12m or future 3m with PCP    Date Provider   Last Visit   10/12/2021 Nathaniel Thomas MD   Next Visit   Visit date not found Nathaniel Thomas MD   ED visits in past 90 days: 0        Note composed:9:37 AM 01/11/2024

## 2024-01-12 ENCOUNTER — TELEPHONE (OUTPATIENT)
Dept: PODIATRY | Facility: CLINIC | Age: 59
End: 2024-01-12
Payer: COMMERCIAL

## 2024-01-17 ENCOUNTER — CLINICAL SUPPORT (OUTPATIENT)
Dept: REHABILITATION | Facility: HOSPITAL | Age: 59
End: 2024-01-17
Attending: STUDENT IN AN ORGANIZED HEALTH CARE EDUCATION/TRAINING PROGRAM
Payer: COMMERCIAL

## 2024-01-17 DIAGNOSIS — R68.89 DECREASED FUNCTIONAL ACTIVITY TOLERANCE: ICD-10-CM

## 2024-01-17 DIAGNOSIS — S92.025D CLOSED NONDISPLACED FRACTURE OF ANTERIOR PROCESS OF LEFT CALCANEUS WITH ROUTINE HEALING, SUBSEQUENT ENCOUNTER: ICD-10-CM

## 2024-01-17 DIAGNOSIS — M25.572 SINUS TARSITIS OF LEFT FOOT: ICD-10-CM

## 2024-01-17 DIAGNOSIS — R29.898 DECREASED STRENGTH OF LOWER EXTREMITY: Primary | ICD-10-CM

## 2024-01-17 DIAGNOSIS — M25.672 DECREASED RANGE OF MOTION OF LEFT ANKLE: ICD-10-CM

## 2024-01-17 PROCEDURE — 97112 NEUROMUSCULAR REEDUCATION: CPT | Mod: PN

## 2024-01-17 PROCEDURE — 97161 PT EVAL LOW COMPLEX 20 MIN: CPT | Mod: PN

## 2024-01-17 PROCEDURE — 97110 THERAPEUTIC EXERCISES: CPT | Mod: PN

## 2024-01-17 NOTE — PLAN OF CARE
[facility]   Physical Therapy Initial Evaluation      Name: Christina Bunn  Clinic Number: [id number]    Therapy Diagnosis:   Encounter Diagnoses   Name Primary?    Sinus tarsitis of left foot     Closed nondisplaced fracture of anterior process of left calcaneus with routine healing, subsequent encounter     Decreased strength of lower extremity Yes    Decreased range of motion of left ankle     Decreased functional activity tolerance         Physician: Simon Whitlock DPM    Physician Orders: PT Eval and Treat  Medical Diagnosis from Referral: M25.572 (ICD-10-CM) - Sinus tarsitis of left foot  S92.025D (ICD-10-CM) - Closed nondisplaced fracture of anterior process of left calcaneus with routine healing, subsequent encounter  Evaluation Date: 1/17/2024  Authorization Period Expiration: 1/10/25  Plan of Care Expiration: 3/17/24  Progress Note Due: 2/17/24  Date of Surgery: N/A  Visit # / Visits authorized: 1 / 1 Eval   FOTO: 1 / 3    Precautions: Asthma     Time In: 10:00 AM  Time Out:11:00 AM   Total Billable Time: 60 minutes    Subjective     Date of onset: May of 2023    History of current condition - Christina reports: that she back in May of last year she was hiking and a dog pulled her down some steps. The initial xray was clear but she continued to have pain and was unable to walk. In July they did confirm that she had fractured her foot and ankle in multiple spots. In November of this year she saw a podiatrist and was placed in a boot in the beginning of the December. She also received injections of cortisone into the left foot at this time as well. She continues to wear the boot up to this point and reports having some improvement in the pain. She is normally very active and prior to the initial injury she was physically active. She will follow up with the podiatrist in a couple of weeks to check on her progress. She reports being compliant with wearing her boot. She was completely  independent with ADL's prior. No prior surgeries to the ankle, hips, or knees. No numbness or tingling involved.    Falls: none     Imaging: MRI studies:  Impression:     Healing anterior calcaneal fracture with more diffuse patchy marrow edema seen in the anterior and mid calcaneus.     There is also some minimal edema in the the cuboid bone suggesting healing contusion.        Electronically signed by: Koffi House MD  Date:                                            2023  Time:                                           14:34    Prior Therapy:   Social History: lives with their spouse  Occupation: [employer] - sitting a good bit  Prior Level of Function: independent with ADL's  Current Level of Function: difficulty walking     Pain:  Current 3/10, worst 6/10, best 0/10   Location: left foot  Description: Aching  Aggravating Factors: Walking and standing for longer periods of time  Easing Factors: rest    Patients goals: improve mobility and decrease pain     Medical History:   Past Medical History:   Diagnosis Date    Asthma        Surgical History:   Christina Bunn  has a past surgical history that includes  section; Tubal ligation; Endometrial ablation (); Oophorectomy; Tonsillectomy; Appendectomy; and Colonoscopy (N/A, 10/12/2019).    Medications:   Christina has a current medication list which includes the following prescription(s): albuterol, alprazolam, epinephrine, famciclovir, fluticasone-salmeterol 100-50 mcg/dose, hydroxocobalamin, l-methylfolate, progesterone, progesterone, trazodone, vitamin b-6, and vitamin d2.    Allergies:   Review of patient's allergies indicates:   Allergen Reactions    Iodinated contrast media Shortness Of Breath and Rash    Insect venom         Objective      Posture: FAIR - no obvious signs of distress. Very pleasant WF.    Gait: slightly antalgic with walking boot on the L foot. Slightly decreased ceasar and step length observed. No AD.    Functional Tests:    SLS EO: ~5-10s B  Squat: [employer] - no signs of knee valgus    Ankle Active/Passive Range of Motion:   Ankle Right Left   DF 5 5   Plantarflexion 45 45   Inversion 35 25   Eversion 30 20      Knee Passive Range of Motion:   Right  Left    Flexion 120 120   Extension 0 0     Hip Passive Range of Motion: WFL B      Lower Extremity Strength   Right  Left    Dorsiflexion 5/5 4+/5   Plantarflexion  5/5 4+/5   Inversion 5/5 4/5   Eversion 5/5 4/5   Hip extension 5/5 4/5   PGM 5/5 4/5       Special Tests:   Right Left   Anterior Drawer Test - -   Varus Stress Test    - -   Valgus Stress Test  - -     Joint Mobility: slightly decreased at the talocrural joint when moving into Dorsiflexion, inversion, and eversion - compared to the contralateral side.    Palpation: Mild to Moderate TTP along the distal aspect of the lateral malleolus of the left ankle.    Neural provocation:  None present B    Edema:  Girth Measurement Fig-8   Right 54 cm   Left 55 cm     Intake Outcome Measure for FOTO Foot Survey    Therapist reviewed FOTO scores for Christina Bunn on 1/17/2024.   FOTO report - see Media section or FOTO account episode details.    Intake Score: 80     Treatment     Total Treatment time (time-based codes) separate from Evaluation: 16 minutes     Christina received the treatments listed below:      Therapeutic Exercises to develop strength, endurance, ROM, and flexibility for 8 minutes including:    Long Siting Calf Stretch 10x10s (L only)  Long Sitting Ankle PF, INV, EV with RTB x10 reps (L only)    Neuromuscular Re-education activities to improve: Balance, Coordination, Kinesthetic, Sense, Proprioception, and Posture for 8 minutes. The following activities were included:    Seated Arch Lifts x10 reps (3s holds) - L only  Seated Eunice Pick Ups x1 bowl (L)  Seated Toe Yoga x1 min - alternating big toe/little toes (L)    Patient Education and Home Exercises     Education provided:   - Home Exercise Program  Administration and Review  - Post Exercise Soreness  - Maintaining a pain free range of motion with all activities  - Anatomy/Physiology of the Ankle and Foot and the surrounding musculature    Written Home Exercises Provided: yes. Exercises were reviewed and Christina was able to demonstrate them prior to the end of the session.  Christina demonstrated good  understanding of the education provided. See EMR under Patient Instructions for exercises provided during therapy sessions.    Assessment     Christina is a 58 y.o. female referred to outpatient Physical Therapy with a medical diagnosis of Sinus tarsitis of left foot and Closed nondisplaced fracture of anterior process of left calcaneus with routine healing, subsequent encounter. Patient presents with a walking boot present on the L foot/ankle, decreased L talocrural joint range of motion - especially into eversion and inversion, decreased ankle strength, decreased endurance to activity, and decreased functional mobility overall. Treatment will focus on improving ankle joint mobility and strength while preventing exacerbation of symptoms.    Patient prognosis is Good.   Patient will benefit from skilled outpatient Physical Therapy to address the deficits stated above and in the chart below, provide patient /family education, and to maximize patientt's level of independence.     Plan of care discussed with patient: Yes  Patient's spiritual, cultural and educational needs considered and patient is agreeable to the plan of care and goals as stated below:     Anticipated Barriers for therapy: none stated    Medical Necessity is demonstrated by the following  History  Co-morbidities and personal factors that may impact the plan of care [] LOW: no personal factors / co-morbidities  [x] MODERATE: 1-2 personal factors / co-morbidities  [] HIGH: 3+ personal factors / co-morbidities    Moderate / High Support Documentation:   Co-morbidities affecting plan of care: Asthma    Personal  Factors:   no deficits     Examination  Body Structures and Functions, activity limitations and participation restrictions that may impact the plan of care [x] LOW: addressing 1-2 elements  [] MODERATE: 3+ elements  [] HIGH: 4+ elements (please support below)    Moderate / High Support Documentation: N/A     Clinical Presentation [x] LOW: stable  [] MODERATE: Evolving  [] HIGH: Unstable     Decision Making/ Complexity Score: low       Goals:  Short Term Goals: 4 weeks   - Patient will be able to stand for at least 30 minutes with minimal to no exacerbation of symptoms for improved tolerance to household and work related duties.  - Patient will be able to ambulate at least 1/2 mile with minimal to no exacerbation of symptoms for improved physical activity and return to PLOF.  - Patient will demonstrate improved Left Ankle range of motion by at least 10 degrees, especially into inversion for increased functional mobility.  - Patient will demonstrate improved LE strength, especially into ankle inversion by at least 1/2 grade via MMT for increased stability and support with functional tasks.    Long Term Goals: 8 weeks   - Patient will be able to stand for at least 1 hour with minimal to no exacerbation of symptoms for improved tolerance to household and work related duties.  - Patient will be able to ambulate at least 1 mile with minimal to no exacerbation of symptoms for improved physical activity and return to PLOF.  - Patient will demonstrate improved Left Ankle range of motion by at least 10 degrees, especially into eversion for increased functional mobility.  - Patient will demonstrate improved LE strength, especially into ankle eversion by at least 1/2 grade via MMT for increased stability and support with functional tasks.  - Patient will demonstrate improved FOTO score that is greater than or equal to the predicted value for improved ability to perform ADL's.  - Patient will demonstrate independence with Home  Exercise Program for continued improvements outside the clinical setting.    Plan     Plan of care Certification: 1/17/2024 to 3/17/24.    Outpatient Physical Therapy 2 times weekly for 8 weeks to include the following interventions: Aquatic Therapy, Cervical/Lumbar Traction, Electrical Stimulation IFC/TENS/PREMOD, Gait Training, Manual Therapy, Moist Heat/ Ice, Neuromuscular Re-ed, Patient Education, Self Care, Therapeutic Activities, Therapeutic Exercise, Ultrasound, and Dry Needling (by a certified therapist).     This patient CAN be treated by a PTA.    Dahlia Jasso, PT, DPT, Cert. DN      Physician's Signature: _________________________________________ Date: ________________

## 2024-01-18 NOTE — PROGRESS NOTES
[facility]   Physical Therapy Treatment Note      Name: Christina Bunn  Clinic Number: [id number]    Therapy Diagnosis:   Encounter Diagnoses   Name Primary?    Decreased strength of lower extremity Yes    Decreased range of motion of left ankle     Decreased functional activity tolerance      Physician: Simon Whitlock DPM    Visit Date: 1/22/2024    Physician Orders: PT Eval and Treat  Medical Diagnosis from Referral: M25.572 (ICD-10-CM) - Sinus tarsitis of left foot  S92.025D (ICD-10-CM) - Closed nondisplaced fracture of anterior process of left calcaneus with routine healing, subsequent encounter  Evaluation Date: 1/17/2024  Authorization Period Expiration: 12/31/24  Plan of Care Expiration: 3/17/24  Progress Note Due: 2/17/24  Date of Surgery: N/A  Visit # / Visits authorized: 1 / 20 (1 / 1 Eval)  FOTO: 1 / 3     Precautions: Asthma      Time In: 7:00 AM  Time Out: 7:53 AM   Total Billable Time: 53 minutes    PTA Visit #: 0/5     Subjective     Patient reports: that she is doing okay this morning, not having any pain. Was able to practice her exercises at home.    She was compliant with home exercise program.  Response to previous treatment: no adverse reactions  Functional change: in progress - first follow up appointment    Pain: 0/10  Location: left ankle/foot    Objective      Objective Measures updated at progress report unless specified.     Treatment     Christina received the treatments listed below:      Therapeutic Exercises to develop strength, endurance, ROM, and flexibility for 13 minutes including:     Patient Education and Home Exercise Program Review  Long Siting Calf Stretch 10x10s (L only) + strap  Long Sitting Hamstring Stretch 10x10s B  Long Sitting Ankle DF, PF, INV, EV with RTB 2x10 reps (L only)       Neuromuscular Re-education activities to improve: Balance, Coordination, Kinesthetic, Sense, Proprioception, and Posture for 30 minutes. The following activities  were included:     Seated Arch Lifts 2x10 reps (3s holds) - L only  Seated Circle Pick Ups x1 bowl (L)  Seated Toe Yoga x 2 min - alternating big toe/little toes (L)  Seated Heel Raises x2 min (L only)  Seated Toe Raises x2 min (L only)  Side-Lying Clamshells 2x10 reps + RTB  SLS Balance 10x10s (L only) + Foam Pad    Therapeutic Activities to improve functional performance for 10 minutes, including:    Shuttle Press x2 min B (3 black cords, 1 red cord)  Shuttle Press x2 min U (1 black cord, 1 red cord) L only  Supine Hip Bridge 2x10 reps B    Patient Education and Home Exercises       Education provided:   - Home Exercise Program Review  - Post Exercise Soreness  - Maintaining a pain free range of motion with all activities  - Anatomy/Physiology of the Ankle and Foot and the surrounding musculature    Written Home Exercises Provided: Patient instructed to cont prior HEP. Exercises were reviewed and Christina was able to demonstrate them prior to the end of the session.  Christina demonstrated good  understanding of the education provided. See Electronic Medical Record under Patient Instructions for exercises provided during therapy sessions    Assessment     Patient tolerated therapy well overall today. Minimal weightbearing was introduced - no provocation of pain noted. Emphasis on slow and controlled movements, especially with arch lifts and seated toe raises. Home Exercise Program was reviewed and patient was encouraged to continue performing outside the clinical setting. Will progress patient as able next session.    Christina Is progressing well towards her goals.   Patient prognosis is Good.     Patient will continue to benefit from skilled outpatient physical therapy to address the deficits listed in the problem list box on initial evaluation, provide pt/family education and to maximize pt's level of independence in the home and community environment.     Patient's spiritual, cultural and educational needs considered and pt  agreeable to plan of care and goals.     Anticipated barriers to physical therapy: none stated    Goals:  Short Term Goals: 4 weeks (progressing, not met)  - Patient will be able to stand for at least 30 minutes with minimal to no exacerbation of symptoms for improved tolerance to household and work related duties.  - Patient will be able to ambulate at least 1/2 mile with minimal to no exacerbation of symptoms for improved physical activity and return to PLOF.  - Patient will demonstrate improved Left Ankle range of motion by at least 10 degrees, especially into inversion for increased functional mobility.  - Patient will demonstrate improved LE strength, especially into ankle inversion by at least 1/2 grade via MMT for increased stability and support with functional tasks.     Long Term Goals: 8 weeks (progressing, not met)  - Patient will be able to stand for at least 1 hour with minimal to no exacerbation of symptoms for improved tolerance to household and work related duties.  - Patient will be able to ambulate at least 1 mile with minimal to no exacerbation of symptoms for improved physical activity and return to PLOF.  - Patient will demonstrate improved Left Ankle range of motion by at least 10 degrees, especially into eversion for increased functional mobility.  - Patient will demonstrate improved LE strength, especially into ankle eversion by at least 1/2 grade via MMT for increased stability and support with functional tasks.  - Patient will demonstrate improved FOTO score that is greater than or equal to the predicted value for improved ability to perform ADL's.  - Patient will demonstrate independence with Home Exercise Program for continued improvements outside the clinical setting.    Plan     Continue with established POC for improved functional mobility overall.    Dahlia Jasso, PT, DPT, Cert. DN

## 2024-01-22 ENCOUNTER — CLINICAL SUPPORT (OUTPATIENT)
Dept: REHABILITATION | Facility: HOSPITAL | Age: 59
End: 2024-01-22
Payer: COMMERCIAL

## 2024-01-22 DIAGNOSIS — R29.898 DECREASED STRENGTH OF LOWER EXTREMITY: Primary | ICD-10-CM

## 2024-01-22 DIAGNOSIS — M25.672 DECREASED RANGE OF MOTION OF LEFT ANKLE: ICD-10-CM

## 2024-01-22 DIAGNOSIS — R68.89 DECREASED FUNCTIONAL ACTIVITY TOLERANCE: ICD-10-CM

## 2024-01-22 PROCEDURE — 97530 THERAPEUTIC ACTIVITIES: CPT | Mod: PN

## 2024-01-22 PROCEDURE — 97110 THERAPEUTIC EXERCISES: CPT | Mod: PN

## 2024-01-22 PROCEDURE — 97112 NEUROMUSCULAR REEDUCATION: CPT | Mod: PN

## 2024-01-23 NOTE — PROGRESS NOTES
[facility]   Physical Therapy Treatment Note      Name: Christina Bunn  Clinic Number: [id number]    Therapy Diagnosis:   Encounter Diagnoses   Name Primary?    Decreased strength of lower extremity Yes    Decreased range of motion of left ankle     Decreased functional activity tolerance      Physician: Simon Whitlock DPM    Visit Date: 1/24/2024    Physician Orders: PT Eval and Treat  Medical Diagnosis from Referral: M25.572 (ICD-10-CM) - Sinus tarsitis of left foot  S92.025D (ICD-10-CM) - Closed nondisplaced fracture of anterior process of left calcaneus with routine healing, subsequent encounter  Evaluation Date: 1/17/2024  Authorization Period Expiration: 12/31/24  Plan of Care Expiration: 3/17/24  Progress Note Due: 2/17/24  Date of Surgery: N/A  Visit # / Visits authorized: 2 / 20 (1 / 1 Eval)  FOTO: 1 / 3     Precautions: Asthma      Time In: 7:04 AM  Time Out: 7:57 AM   Total Billable Time: 53 minutes    PTA Visit #: 0/5     Subjective     Patient reports: that she did fine after last session. No pain.    She was compliant with home exercise program.  Response to previous treatment: no adverse reactions  Functional change: in progress    Pain: 0/10  Location: left ankle/foot    Objective      Objective Measures updated at progress report unless specified.     Treatment     Christina received the treatments listed below:      Therapeutic Exercises to develop strength, endurance, ROM, and flexibility for 13 minutes including:     Patient Education and Home Exercise Program Review  Standing Calf Stretch 10x10s (L only)  Standing Soleus Stretch 10x10s (L only)  Long Sitting Hamstring Stretch 10x10s (L only)  Long Sitting Ankle DF, PF, INV, EV with RTB 2x10 reps (L only)       Neuromuscular Re-education activities to improve: Balance, Coordination, Kinesthetic, Sense, Proprioception, and Posture for 30 minutes. The following activities were included:     Seated Arch Lifts 2x10 reps (3s  holds) - L only  Seated White Pick Ups x1 bowl (L)  Seated Toe Yoga x 2 min - alternating big toe/little toes (L)  Seated Heel Raises x2 min (L only) + #10 KB  Seated Toe Raises x2 min (L only)  Seated Towel Scrunches x2 min (L only)  Side-Lying Clamshells 3x10 reps + RTB  SLS Balance 10x10s (L only) + Foam Pad + Shoulder Extensions with GTB  Re-bounder Toss 3x10 reps + SLS on Foam  Standing Hip Abduction 3x10 reps B    Therapeutic Activities to improve functional performance for 10 minutes, including:    Shuttle Press x2 min B (3 black cords, 1 red cord)  Shuttle Press x2 min U (1 black cord, 1 red cord) L only  Supine Hip Bridge 3x10 reps B    Patient Education and Home Exercises       Education provided:   - Home Exercise Program Review  - Post Exercise Soreness  - Maintaining a pain free range of motion with all activities  - Anatomy/Physiology of the Ankle and Foot and the surrounding musculature    Written Home Exercises Provided: Patient instructed to cont prior HEP. Exercises were reviewed and Chrisitna was able to demonstrate them prior to the end of the session.  Christina demonstrated good  understanding of the education provided. See Electronic Medical Record under Patient Instructions for exercises provided during therapy sessions    Assessment     Patient tolerated therapy well overall today. No pain reported with activities. Emphasis placed more heavily on single leg stability and balance combined with performance of functional movements. Patient was educated on the importance of continuing to perform Home Exercise Program for maintenance outside the clinical setting.    Christina Is progressing well towards her goals.   Patient prognosis is Good.     Patient will continue to benefit from skilled outpatient physical therapy to address the deficits listed in the problem list box on initial evaluation, provide pt/family education and to maximize pt's level of independence in the home and community environment.      Patient's spiritual, cultural and educational needs considered and pt agreeable to plan of care and goals.     Anticipated barriers to physical therapy: none stated    Goals:  Short Term Goals: 4 weeks (progressing, not met)  - Patient will be able to stand for at least 30 minutes with minimal to no exacerbation of symptoms for improved tolerance to household and work related duties.  - Patient will be able to ambulate at least 1/2 mile with minimal to no exacerbation of symptoms for improved physical activity and return to PLOF.  - Patient will demonstrate improved Left Ankle range of motion by at least 10 degrees, especially into inversion for increased functional mobility.  - Patient will demonstrate improved LE strength, especially into ankle inversion by at least 1/2 grade via MMT for increased stability and support with functional tasks.     Long Term Goals: 8 weeks (progressing, not met)  - Patient will be able to stand for at least 1 hour with minimal to no exacerbation of symptoms for improved tolerance to household and work related duties.  - Patient will be able to ambulate at least 1 mile with minimal to no exacerbation of symptoms for improved physical activity and return to PLOF.  - Patient will demonstrate improved Left Ankle range of motion by at least 10 degrees, especially into eversion for increased functional mobility.  - Patient will demonstrate improved LE strength, especially into ankle eversion by at least 1/2 grade via MMT for increased stability and support with functional tasks.  - Patient will demonstrate improved FOTO score that is greater than or equal to the predicted value for improved ability to perform ADL's.  - Patient will demonstrate independence with Home Exercise Program for continued improvements outside the clinical setting.    Plan     Continue with established POC for improved functional mobility overall.    Dahlia Jasso, PT, DPT, Cert. DN

## 2024-01-24 ENCOUNTER — CLINICAL SUPPORT (OUTPATIENT)
Dept: REHABILITATION | Facility: HOSPITAL | Age: 59
End: 2024-01-24
Payer: COMMERCIAL

## 2024-01-24 DIAGNOSIS — R29.898 DECREASED STRENGTH OF LOWER EXTREMITY: Primary | ICD-10-CM

## 2024-01-24 DIAGNOSIS — R68.89 DECREASED FUNCTIONAL ACTIVITY TOLERANCE: ICD-10-CM

## 2024-01-24 DIAGNOSIS — M25.672 DECREASED RANGE OF MOTION OF LEFT ANKLE: ICD-10-CM

## 2024-01-24 PROCEDURE — 97110 THERAPEUTIC EXERCISES: CPT | Mod: PN

## 2024-01-24 PROCEDURE — 97112 NEUROMUSCULAR REEDUCATION: CPT | Mod: PN

## 2024-01-24 PROCEDURE — 97530 THERAPEUTIC ACTIVITIES: CPT | Mod: PN

## 2024-01-25 ENCOUNTER — OFFICE VISIT (OUTPATIENT)
Dept: FAMILY MEDICINE | Facility: CLINIC | Age: 59
End: 2024-01-25
Payer: COMMERCIAL

## 2024-01-25 DIAGNOSIS — Z13.6 ENCOUNTER FOR LIPID SCREENING FOR CARDIOVASCULAR DISEASE: ICD-10-CM

## 2024-01-25 DIAGNOSIS — Z13.220 ENCOUNTER FOR LIPID SCREENING FOR CARDIOVASCULAR DISEASE: ICD-10-CM

## 2024-01-25 DIAGNOSIS — Z00.00 WELL ADULT EXAM: Primary | ICD-10-CM

## 2024-01-25 DIAGNOSIS — E55.9 VITAMIN D DEFICIENCY: ICD-10-CM

## 2024-01-25 DIAGNOSIS — Z79.899 ENCOUNTER FOR LONG-TERM (CURRENT) USE OF MEDICATIONS: ICD-10-CM

## 2024-01-25 DIAGNOSIS — R05.9 COUGH, UNSPECIFIED TYPE: ICD-10-CM

## 2024-01-25 DIAGNOSIS — G47.00 INSOMNIA, UNSPECIFIED TYPE: ICD-10-CM

## 2024-01-25 PROCEDURE — 99396 PREV VISIT EST AGE 40-64: CPT | Mod: 95,,, | Performed by: FAMILY MEDICINE

## 2024-01-25 PROCEDURE — 1159F MED LIST DOCD IN RCRD: CPT | Mod: CPTII,95,, | Performed by: FAMILY MEDICINE

## 2024-01-25 PROCEDURE — 1160F RVW MEDS BY RX/DR IN RCRD: CPT | Mod: CPTII,95,, | Performed by: FAMILY MEDICINE

## 2024-01-25 RX ORDER — TRAZODONE HYDROCHLORIDE 50 MG/1
50 TABLET ORAL NIGHTLY
Qty: 90 TABLET | Refills: 3 | Status: SHIPPED | OUTPATIENT
Start: 2024-01-25

## 2024-01-25 RX ORDER — PROMETHAZINE HYDROCHLORIDE AND DEXTROMETHORPHAN HYDROBROMIDE 6.25; 15 MG/5ML; MG/5ML
5 SYRUP ORAL EVERY 4 HOURS PRN
Qty: 120 ML | Refills: 0 | Status: SHIPPED | OUTPATIENT
Start: 2024-01-25 | End: 2024-02-04

## 2024-01-25 RX ORDER — FAMCICLOVIR 500 MG/1
TABLET ORAL
Qty: 30 TABLET | Refills: 12 | Status: SHIPPED | OUTPATIENT
Start: 2024-01-25

## 2024-01-25 NOTE — PROGRESS NOTES
Primary Care Telemedicine Note  The patient location is:  Patient's Home - [place]  The chief complaint leading to consultation is:   Chief Complaint   Patient presents with    Annual Exam      Total time:  see MDM below. The total time spent on the encounter, which includes face to face time and non-face to face time preparing to see the patient (eg, review of previous medical records, tests), Obtaining and/or reviewing separately obtained history, documenting clinical information in the electronic or other health record, independently interpreting results (not separately reported)/communicating results to the patient/family/caregiver, and/or care coordination (not separately reported).    Visit type: Virtual visit with synchronous audio and video  Each patient to whom he or she provides medical services by telemedicine is:  (1) informed of the relationship between the physician and patient and the respective role of any other health care provider with respect to management of the patient; and (2) notified that he or she may decline to receive medical services by telemedicine and may withdraw from such care at any time.  =================================================================  This note is specifically for wellness visit performed today.   WELLNESS EXAM      Patient ID: Christina Bunn is a 58 y.o. female with  has a past medical history of Asthma.   Chief Complaint:  Encounter for wellness exam    Well Adult Physical: Patient here for a comprehensive physical exam.The patient reports Chronic problems.  The patient's last visit with me was on 10/12/2021.    January 2024:  Patient reports she is overall doing well.  She has been having some issues with her foot fracture.  She is following with specialist for this.  Patient needing refills on medication.  Patient reports having a cough and would like treatment for her cough.    Reviewed Care team: Prev PCP: Dr. Ajay MAHMOOD: Alysia Bhandari NP [place]  "Sigifredo Crews Simon Kwon    Patient here for establish care appointment and for medication refills.  Patient reports that she is needing her as needed Xanax.  Previously prescribed by Dr. Moss who is retiring.  Patient has rare usage.   reviewed.  Patient also complains of insomnia.  She has tried over-the-counter medications which do not help.  She has also tried Xanax at night but gives her side effects when used for this condition.  Denies any SI HI hallucinations.  Patient reports severe allergic reactions to certain things and requires an EpiPen.  She is due for a refill.  Patient also needing refill on famciclovir for outbreaks of cold sores occasionally.    Chronic. Vit d deficiency. Takes vitamin d supplement. No SE reported. Fatigue is slightly improved.   No results found for: "[id number]"     Do you take any herbs or supplements that were not prescribed by a doctor? no   Are you taking calcium supplements? no    History:  Requesting records  LMP: Patient's last menstrual period was 01/28/2015 (approximate).   MMG:   ovarian cancer in 2 relatives (maternal grandmother (comments: older, age unknown), mother (age of onset: 45)).   Assessment    Overall   2 - Benign   Mammography Recommendations     Side Due   Routine Screening Mammogram in 1 year  9/26/2024     Breast Density     Overall   Breast Composition c - Heterogeneously dense   Details    Reading Physician Reading Date Result Priority   Annika Simon MD  [phone] 10/2/2023 Routine     Physician Responsible for MQSA Outcome Reason    Annika Simon MD Signed      Narrative & Impression  Result:   Mammo Digital Screening Bilat w/ Ry     History:  Patient is 58 y.o. and is seen for a screening mammogram.     Films Compared:  Prior images (if available) were compared.     Findings:  This procedure was performed using tomosynthesis. Computer-aided detection was utilized in the interpretation of this examination.  The " breasts are heterogeneously dense, which may obscure small masses. There is no evidence of suspicious masses, calcifications, or other abnormal findings.     Impression:  Bilateral  There is no mammographic evidence of malignancy.     BI-RADS Category:   Overall: 2 - Benign        Recommendation:  Routine screening mammogram in 1 year is recommended.        Your estimated lifetime risk of breast cancer (to age 85) based on Tyrer-Cuzick risk assessment model is Tyrer-Cuzick: 18.09 %. According to the American Cancer Society, patients with a lifetime breast cancer risk of 20% or higher might benefit from supplemental screening tests.        Patient information entered into a reminder system with a target due date for the above recommendation.             Exam Ended: 09/27/23 13:59 CDT           Impression:  Bilateral  There is no mammographic evidence of malignancy.     BI-RADS Category:   Overall: 2 - Benign     Recommendation:  Routine screening mammogram in 1 year is recommended.        Your estimated lifetime risk of breast cancer (to age 85) based on Tyrer-Cuzick risk assessment model is Tyrer-Cuzick: 22.81 %. According to the American Cancer Society, patients with a lifetime breast cancer risk of 20% or higher might benefit from supplemental screening tests, such as screening breast MRI.        Patient information entered into a reminder system with a target due date for the above recommendation.        Exam Ended: 09/17/21 11:13          PAP: 8/29/2019 requesting records  Colon cancer screening: Last Colonoscopy completed on 10/12/2019   Findings:       Skin tags were found on perianal exam.        A 3 mm polyp was found in the ascending colon. The polyp was        sessile. The polyp was removed with a cold biopsy forceps. Resection        and retrieval were complete.        A 8 mm polyp was found in the cecum. The polyp was sessile. The        polyp was removed with a saline injection-lift technique using a         cold snare. Resection and retrieval were complete.        No additional abnormalities were found on retroflexion.   Impression:           - Perianal skin tags found on perianal exam.                         - One 3 mm polyp in the ascending colon, removed                         with a cold biopsy forceps. Resected and retrieved.                         - One 8 mm polyp in the cecum, removed using                         injection-lift and a hot snare. Resected and                         retrieved.   Recommendation:       - Patient has a contact number available for                         emergencies. The signs and symptoms of potential                         delayed complications were discussed with the                         patient. Return to normal activities tomorrow.                         Written discharge instructions were provided to the                         patient.                         - Discharge patient to home (via wheelchair).                         - Resume previous diet today.                         - Continue present medications.                         - No aspirin, ibuprofen, naproxen, or other                         non-steroidal anti-inflammatory drugs for 7 days                         after polyp removal.                         - Await pathology results.                         - Repeat colonoscopy in 5 years for surveillance.   MD Krista Renae MD   10/12/2019 10:22:02 AM   Health Maintenance Topics with due status: Not Due       Topic Last Completion Date    TETANUS VACCINE 05/10/2017    Colorectal Cancer Screening 10/12/2019    Cervical Cancer Screening 09/17/2021    Hemoglobin A1c (Diabetic Prevention Screening) 10/13/2021    Lipid Panel 10/13/2021    Mammogram 09/27/2023      ==============================================  History reviewed.   Health Maintenance Due   Topic Date Due    COVID-19 Vaccine (1) Never done    HIV Screening  Never done     Shingles Vaccine (1 of 2) Never done    Influenza Vaccine (1) 2023   Patient declines vaccinations.    Past Medical History:  Past Medical History:   Diagnosis Date    Asthma      Past Surgical History:   Procedure Laterality Date    ADENOIDECTOMY  1970    APPENDECTOMY       SECTION      COLONOSCOPY N/A 10/12/2019    Procedure: COLONOSCOPY;  Surgeon: Krista Tolbert MD;  Location: [facility];  Service: Endoscopy;  Laterality: N/A;    ENDOMETRIAL ABLATION      OOPHORECTOMY      one ovary removed    TONSILLECTOMY      TUBAL LIGATION       Review of patient's allergies indicates:   Allergen Reactions    Iodinated contrast media Shortness Of Breath and Rash    Insect venom      Current Outpatient Medications on File Prior to Visit   Medication Sig Dispense Refill    albuterol (PROVENTIL/VENTOLIN HFA) 90 mcg/actuation inhaler Inhale 2 puffs into the lungs every 6 (six) hours as needed for Wheezing or Shortness of Breath. 54 g 0    ALPRAZolam (XANAX) 0.25 MG tablet Take 1 tablet (0.25 mg total) by mouth 3 (three) times daily as needed for Anxiety. 90 tablet 0    EPINEPHrine (EPIPEN 2-MAR) 0.3 mg/0.3 mL AtIn Inject 0.3 mLs (0.3 mg total) into the muscle once. for 1 dose 2 each 0    fluticasone-salmeterol diskus inhaler 100-50 mcg USE 1 INHALATION TWICE A DAY 60 each 11    hydroxocobalamin 1,000 mcg/mL Soln       L-METHYLFOLATE 15 mg Tab       progesterone (PROMETRIUM) 100 MG capsule       progesterone (PROMETRIUM) 200 MG capsule       VITAMIN B-6 50 MG tablet       VITAMIN D2 50,000 unit capsule       [DISCONTINUED] famciclovir (FAMVIR) 500 MG tablet TAKE ONE TABLET BY MOUTH EVERY 8 HOURS -IMPORTANT: FINISH ALL OF THISMEDICINE UNLESS OTHERWISE DIRECTED BY YOUR DOCTOR. 30 tablet 12    [DISCONTINUED] traZODone (DESYREL) 50 MG tablet TAKE 1 TABLET EVERY EVENING 90 tablet 3     No current facility-administered medications on file prior to visit.     Social History     Socioeconomic History    Marital status:     Tobacco Use    Smoking status: Never    Smokeless tobacco: Never   Substance and Sexual Activity    Alcohol use: Yes     Comment: wine social    Drug use: No    Sexual activity: Yes     Partners: Male     Birth control/protection: Surgical     Social Determinants of Health     Financial Resource Strain: Low Risk  (1/25/2024)    Overall Financial Resource Strain (CARDIA)     Difficulty of Paying Living Expenses: Not hard at all   Food Insecurity: No Food Insecurity (1/25/2024)    Hunger Vital Sign     Worried About Running Out of Food in the Last Year: Never true     Ran Out of Food in the Last Year: Never true   Transportation Needs: No Transportation Needs (1/25/2024)    PRAPARE - Transportation     Lack of Transportation (Medical): No     Lack of Transportation (Non-Medical): No   Physical Activity: Insufficiently Active (1/25/2024)    Exercise Vital Sign     Days of Exercise per Week: 3 days     Minutes of Exercise per Session: 30 min   Stress: No Stress Concern Present (1/25/2024)    [place] [employer] - Occupational Stress Questionnaire     Feeling of Stress : Not at all   Social Connections: Unknown (1/25/2024)    Social Connection and Isolation Panel [NHANES]     Frequency of Communication with Friends and Family: Twice a week     Frequency of Social Gatherings with Friends and Family: Twice a week     Active Member of Clubs or Organizations: Patient declined     Attends Club or Organization Meetings: Never     Marital Status:    Housing Stability: Low Risk  (1/25/2024)    Housing Stability Vital Sign     Unable to Pay for Housing in the Last Year: No     Number of Places Lived in the Last Year: 1     Unstable Housing in the Last Year: No     Family History   Problem Relation Age of Onset    Cancer Mother         ovarian    Ovarian cancer Mother 45    Heart disease Father     Kidney disease Paternal Aunt     Cancer Maternal Grandmother     Ovarian cancer Maternal  Grandmother         older, age unknown       Review of Systems   Constitutional:  Positive for activity change and unexpected weight change.   HENT:  Negative for hearing loss, rhinorrhea and trouble swallowing.    Eyes:  Negative for discharge and visual disturbance.   Respiratory:  Negative for chest tightness and wheezing.    Cardiovascular:  Negative for chest pain and palpitations.   Gastrointestinal:  Negative for blood in stool, constipation, diarrhea and vomiting.   Endocrine: Negative for polydipsia and polyuria.   Genitourinary:  Negative for difficulty urinating, dysuria, hematuria and menstrual problem.   Musculoskeletal:  Negative for arthralgias, joint swelling and neck pain.   Neurological:  Negative for weakness and headaches.   Psychiatric/Behavioral:  Negative for dysphoric mood.       Objective:    Nursing note and vitals reviewed.  There were no vitals filed for this visit.    There is no height or weight on file to calculate BMI.   Physical Exam  Constitutional:       General: She is not in acute distress.     Appearance: She is well-developed. She is not ill-appearing, toxic-appearing or diaphoretic.   HENT:      Head: Normocephalic and atraumatic.      Right Ear: Hearing and external ear normal.      Left Ear: Hearing and external ear normal.   Eyes:      General: Lids are normal.      Conjunctiva/sclera: Conjunctivae normal.   Pulmonary:      Effort: Pulmonary effort is normal. No respiratory distress.   Musculoskeletal:         General: Normal range of motion.      Cervical back: Normal range of motion.   Skin:     Coloration: Skin is not pale.   Neurological:      Mental Status: She is alert. She is not disoriented.   Psychiatric:         Attention and Perception: She is attentive.         Mood and Affect: Mood is not anxious or depressed.         Speech: Speech is not rapid and pressured or slurred.         Behavior: Behavior normal. Behavior is not agitated, aggressive or hyperactive.  Behavior is cooperative.         Thought Content: Thought content normal. Thought content is not paranoid or delusional. Thought content does not include homicidal or suicidal ideation. Thought content does not include homicidal or suicidal plan.         Cognition and Memory: Memory is not impaired.         Judgment: Judgment normal.       Lab Visit on 10/13/2021   Component Date Value Ref Range Status    WBC 10/13/2021 5.89  3.90 - 12.70 K/uL Final    RBC 10/13/2021 4.16  4.00 - 5.40 M/uL Final    Hemoglobin 10/13/2021 12.8  12.0 - 16.0 g/dL Final    Hematocrit 10/13/2021 40.3  37.0 - 48.5 % Final    MCV 10/13/2021 97  82 - 98 fL Final    MCH 10/13/2021 30.8  27.0 - 31.0 pg Final    MCHC 10/13/2021 31.8 (L)  32.0 - 36.0 g/dL Final    RDW 10/13/2021 13.6  11.5 - 14.5 % Final    Platelets 10/13/2021 349  150 - 450 K/uL Final    MPV 10/13/2021 10.0  9.2 - 12.9 fL Final    TSH 10/13/2021 1.862  0.400 - 4.000 uIU/mL Final    Sodium 10/13/2021 143  136 - 145 mmol/L Final    Potassium 10/13/2021 4.4  3.5 - 5.1 mmol/L Final    Chloride 10/13/2021 108  95 - 110 mmol/L Final    CO2 10/13/2021 19 (L)  23 - 29 mmol/L Final    Glucose 10/13/2021 83  70 - 110 mg/dL Final    BUN 10/13/2021 13  6 - 20 mg/dL Final    Creatinine 10/13/2021 0.8  0.5 - 1.4 mg/dL Final    Calcium 10/13/2021 10.1  8.7 - 10.5 mg/dL Final    Total Protein 10/13/2021 7.7  6.0 - 8.4 g/dL Final    Albumin 10/13/2021 4.2  3.5 - 5.2 g/dL Final    Total Bilirubin 10/13/2021 0.4  0.1 - 1.0 mg/dL Final    Comment: For infants and newborns, interpretation of results should be based  on gestational age, weight and in agreement with clinical  observations.    Premature Infant recommended reference ranges:  Up to 24 hours.............<8.0 mg/dL  Up to 48 hours............<12.0 mg/dL  3-5 days..................<15.0 mg/dL  6-29 days.................<15.0 mg/dL      Alkaline Phosphatase 10/13/2021 85  55 - 135 U/L Final    AST 10/13/2021 22  10 - 40 U/L Final    ALT  10/13/2021 22  10 - 44 U/L Final    Anion Gap 10/13/2021 16  8 - 16 mmol/L Final    eGFR if African American 10/13/2021 >60.0  >60 mL/min/1.73 m^2 Final    eGFR if non African American 10/13/2021 >60.0  >60 mL/min/1.73 m^2 Final    Comment: Calculation used to obtain the estimated glomerular filtration  rate (eGFR) is the CKD-EPI equation.       Hemoglobin A1C 10/13/2021 5.2  4.0 - 5.6 % Final    Comment: ADA Screening Guidelines:  5.7-6.4%  Consistent with prediabetes  >or=6.5%  Consistent with diabetes    High levels of fetal hemoglobin interfere with the HbA1C  assay. Heterozygous hemoglobin variants (HbS, HgC, etc)do  not significantly interfere with this assay.   However, presence of multiple variants may affect accuracy.      Estimated Avg Glucose 10/13/2021 103  68 - 131 mg/dL Final    Cholesterol 10/13/2021 223 (H)  120 - 199 mg/dL Final    Comment: The National Cholesterol Education Program (NCEP) has set the  following guidelines (reference ranges) for Cholesterol:  Optimal.....................<200 mg/dL  Borderline High.............200-239 mg/dL  High........................> or = 240 mg/dL      Triglycerides 10/13/2021 75  30 - 150 mg/dL Final    Comment: The National Cholesterol Education Program (NCEP) has set the  following guidelines (reference values) for triglycerides:  Normal......................<150 mg/dL  Borderline High.............150-199 mg/dL  High........................200-499 mg/dL      HDL 10/13/2021 62  40 - 75 mg/dL Final    Comment: The National Cholesterol Education Program (NCEP) has set the  following guidelines (reference values) for HDL Cholesterol:  Low...............<40 mg/dL  Optimal...........>60 mg/dL      LDL Cholesterol 10/13/2021 146.0  63.0 - 159.0 mg/dL Final    Comment: The National Cholesterol Education Program (NCEP) has set the  following guidelines (reference values) for LDL Cholesterol:  Optimal.......................<130 mg/dL  Borderline  High...............130-159 mg/dL  High..........................160-189 mg/dL  Very High.....................>190 mg/dL      HDL/Cholesterol Ratio 10/13/2021 27.8  20.0 - 50.0 % Final    Total Cholesterol/HDL Ratio 10/13/2021 3.6  2.0 - 5.0 Final    Non-HDL Cholesterol 10/13/2021 161  mg/dL Final    Comment: Risk category and Non-HDL cholesterol goals:  Coronary heart disease (CHD)or equivalent (10-year risk of CHD >20%):  Non-HDL cholesterol goal     <130 mg/dL  Two or more CHD risk factors and 10-year risk of CHD <= 20%:  Non-HDL cholesterol goal     <160 mg/dL  0 to 1 CHD risk factor:  Non-HDL cholesterol goal     <190 mg/dL        Assessment / Plan:      1.  ANNUAL WELLNESS EXAM -patient here for annual wellness exam.  Labs ordered.  Health maintenance was reviewed and ordered.  Medications were reviewed and reconciled.   Anticipatory guidance: Don't smoke.  Healthy diet and regular exercise recommended. Vaccine recommendations discussed.  See orders.  Reviewed Anticipatory guidance, risk factor reduction interventions and counseling, Complete history , physical was completed today.  Complete and thorough medication reconciliation was performed.  Discussed risks and benefits of medications.  Advised patient on orders and health maintenance.  We discussed old records and old labs if available.  Will request any records not available through epic.  Continue current medications listed on your summary sheet.  Anxiety:  Continue Xanax as needed for panic attacks.The patient was checked in the [facility] Board of Pharmacy's  Prescription Monitoring Program. There appears to be no incongruities with the patient's verbalized history. Please be advised of condition course.  SNRI/SSRI is first-line treatment for this condition.  Please be advised of the risk of discontinuing this medication without tapering/contacting MD.  Patient has been advised of side effects, and all questions were answered.  Patient voiced  understanding.  Patient will follow up routinely and notify us if having any side effects or worsening or persistent symptoms.  ER precautions were given. Antidepressant/Antianxiety Medication Initiation:  Patient informed of risks, benefits, and potential side effects of medication and accepts informed consent.  Common side effects include nausea, fatigue, headache, insomnia, etc see medication insert for complete side effect profile.  Most importantly be advised of the possibility of new or worsening suicidal thoughts/depression/anxiety etcetera.  Please be advised to stop the medication immediately and seek urgent treatment if this occurs.  Therefore please do not to abruptly discontinue medication without physician guidance except in cases of sudden onset or worsening of SI.   Insomnia:  Continue trazodone 50 milligrams daily.  Titrate up if needed.Discussed insomnia condition course.  Advised of first-line medications for this condition.  Also discussed sleep hygiene.  Information was given below.  Good sleep habits (sometimes referred to as sleep hygiene) can help you get a good nights sleep.    Some habits that can improve your sleep health:  -Be consistent. Go to bed at the same time each night and get up at the same time each morning, including on the weekends  -Make sure your bedroom is quiet, dark, relaxing, and at a comfortable temperature  -Remove electronic devices, such as TVs, computers, and smart phones, from the bedroom  -Avoid large meals, caffeine, and alcohol before bedtime  -Get some exercise. Being physically active during the day can help you fall asleep more easily at night.    Avoid triggers for allergic reactions.  Refill EpiPen.  Refill famciclovir for cold sores.  Follow-up sooner if uncontrolled.Discussed condition course and signs and symptoms to expect.  Patient advised take anti-inflammatories and or Tylenol for pain or fever.  ER precautions.  Call MD or follow-up to clinic if  not improving or worsening symptoms.  Asthma:  Refill inhalers.  Discussed asthma action plan.  ER precautions.  All questions were answered. Patient had no further concerns. Advised of Wellness plan. Follow up in 1 year for ANNUAL WELLNESS EXAM  History of cold sores: Continue Famvir per flare.    Cough:  Start promethazine DM.  Discussed condition course and signs and symptoms to expect.  Patient advised take anti-inflammatories and or Tylenol for pain or fever.  ER precautions.  Call MD or follow-up to clinic if not improving or worsening symptoms.    Orders Placed This Encounter   Procedures    Vitamin D     Standing Status:   Future     Standing Expiration Date:   1/24/2025    CBC Without Differential     Standing Status:   Future     Standing Expiration Date:   3/25/2025    Comprehensive Metabolic Panel     Standing Status:   Future     Standing Expiration Date:   3/25/2025    TSH     Standing Status:   Future     Standing Expiration Date:   3/25/2025    Hemoglobin A1C     Standing Status:   Future     Standing Expiration Date:   3/25/2025    Lipid Panel     Standing Status:   Future     Standing Expiration Date:   3/25/2025    Urinalysis     Standing Status:   Future     Standing Expiration Date:   3/25/2025     Order Specific Question:   Collection Type     Answer:   Urine, Clean Catch     Medications Ordered This Encounter   Medications    famciclovir (FAMVIR) 500 MG tablet     Sig: TAKE ONE TABLET BY MOUTH EVERY 8 HOURS -IMPORTANT: FINISH ALL OF THISMEDICINE UNLESS OTHERWISE DIRECTED BY YOUR DOCTOR.     Dispense:  30 tablet     Refill:  12     This prescription was filled on 10/17/2017. Any refills authorized will be placed on file.    promethazine-dextromethorphan (PROMETHAZINE-DM) 6.25-15 mg/5 mL Syrp     Sig: Take 5 mLs by mouth every 4 (four) hours as needed (cough).     Dispense:  120 mL     Refill:  0    traZODone (DESYREL) 50 MG tablet     Sig: Take 1 tablet (50 mg total) by mouth every evening.      Dispense:  90 tablet     Refill:  3      Future Appointments       Date Provider Specialty Appt Notes    1/29/2024 Dahlia Jasso, PT Outpatient Rehab 2x a week  no copay    1/31/2024 Dahlia Jasso, PT Outpatient Rehab 2x a week  no copay    2/5/2024 Dahlia Jasso, PT Outpatient Rehab 2x a week    2/7/2024 Dahlia Jasso, PT Outpatient Rehab 2x a week  no copay    2/12/2024 Dahlia Jasso, PT Outpatient Rehab 2x a week  no copay    2/14/2024 Dahlia Jasso, PT Outpatient Rehab 2x a week  no copay    2/14/2024 Simon Whitlock, DPM Podiatry 1 month f/u             Nathaniel Thomas MD

## 2024-01-25 NOTE — PATIENT INSTRUCTIONS
Follow up in about 1 year (around 1/25/2025), or if symptoms worsen or fail to improve, for Annual Wellness Exam.     Dear patient,   As a result of recent federal legislation (The Federal Cures Act), you may receive lab or pathology results from your visit in your [facility] account before your physician is able to contact you. Your physician or their representative will relay the results to you with their recommendations at their soonest availability.     If no improvement in symptoms or symptoms worsen, please be advised to call MD, follow-up at clinic and/or go to ER if becomes severe.    Nathaniel Thomas M.D.        We Offer TELEHEALTH & Same Day Appointments!   Book your Telehealth appointment with me through my nurse or   Clinic appointments on [employer]!    [address] [place] [address]    Office: [phone]   FAX: [phone]    Check out my Facebook Page and Follow Me at: [email or web address]    Check out my website at [facility] by clicking on: [email or web address]    To Schedule appointments online, go to [employer]: [email or web address]

## 2024-01-25 NOTE — PROGRESS NOTES
[facility]   Physical Therapy Treatment Note      Name: Christina Bunn  Clinic Number: [id number]    Therapy Diagnosis:   Encounter Diagnoses   Name Primary?    Decreased strength of lower extremity Yes    Decreased range of motion of left ankle     Decreased functional activity tolerance      Physician: Simon Whitlock DPM    Visit Date: 1/29/2024    Physician Orders: PT Eval and Treat  Medical Diagnosis from Referral: M25.572 (ICD-10-CM) - Sinus tarsitis of left foot  S92.025D (ICD-10-CM) - Closed nondisplaced fracture of anterior process of left calcaneus with routine healing, subsequent encounter  Evaluation Date: 1/17/2024  Authorization Period Expiration: 12/31/24  Plan of Care Expiration: 3/17/24  Progress Note Due: 2/17/24  Date of Surgery: N/A  Visit # / Visits authorized: 3 / 20 (1 / 1 Eval)  FOTO: 2 / 3     Precautions: Asthma      Time In: 7:00 AM  Time Out: 7:53 AM   Total Billable Time: 53 minutes    PTA Visit #: 0/5     Subjective     Patient reports: she is completely out of the boot now - this is her 4th day out of it. She is having a little soreness in the foot thinks it could be due to wearing flip flops yesterday. Otherwise she felt fine after last session.    She was compliant with home exercise program.  Response to previous treatment: no adverse reactions  Functional change: in progress    Pain: 0/10  Location: left ankle/foot    Objective      Objective Measures updated at progress report unless specified.     FOTO: 80    Treatment     Christina received the treatments listed below:      Therapeutic Exercises to develop strength, endurance, ROM, and flexibility for 13 minutes including:     Patient Education and Home Exercise Program Review  Standing Calf Stretch 10x10s (L only)  Standing Soleus Stretch 10x10s (L only)  Long Sitting Hamstring Stretch 10x10s (L only)  Long Sitting Ankle DF, PF, INV, EV with RTB 3x10 reps (L only)  Standing Heel Raises 4x10 reps B        Neuromuscular Re-education activities to improve: Balance, Coordination, Kinesthetic, Sense, Proprioception, and Posture for 30 minutes. The following activities were included:     Seated Arch Lifts 2x10 reps (3s holds) - L only  Seated [place] Pick Ups x1 bowl (L)  Seated Toe Yoga x 2 min - alternating big toe/little toes (L)  Seated Heel Raises x2 min (L only) + #10 KB  Seated Toe Raises x2 min (L only)  SLS Balance 3x10 reps + Foam Pad + Shoulder Extensions with GTB  Re-bounder Toss 3x10 reps + SLS on Foam (dodgeball)  Standing Hip Abduction 3x10 reps B + RTB around ankles  Standing Hip Extensions 2x10 reps B + RTB around ankles      Therapeutic Activities to improve functional performance for 10 minutes, including:    Shuttle Press x2 min B (3 black cords, 1 red cord)  Shuttle Press x2 min U (1 black cord, 1 red cord) L only  Supine Hip Bridge 3x10 reps B + RTB around knees with Hip Abduction  Sit to Stands on Foam 2x10 reps + #10 KB    Patient Education and Home Exercises       Education provided:   - Home Exercise Program Review  - Post Exercise Soreness  - Maintaining a pain free range of motion with all activities  - Anatomy/Physiology of the Ankle and Foot and the surrounding musculature    Written Home Exercises Provided: Patient instructed to cont prior HEP. Exercises were reviewed and Christina was able to demonstrate them prior to the end of the session.  Christina demonstrated good  understanding of the education provided. See Electronic Medical Record under Patient Instructions for exercises provided during therapy sessions    Assessment     Patient has begun ambulating and performing activities without her boot. No exacerbation of symptoms reported during treatment this date. Emphasis placed on equal weightbearing when ambulating and performing activities. More functional activities were performed in order to ensure control and stability. Will continue to progress functional movements/high level activities  next session.     Christina Is progressing well towards her goals.   Patient prognosis is Good.     Patient will continue to benefit from skilled outpatient physical therapy to address the deficits listed in the problem list box on initial evaluation, provide pt/family education and to maximize pt's level of independence in the home and community environment.     Patient's spiritual, cultural and educational needs considered and pt agreeable to plan of care and goals.     Anticipated barriers to physical therapy: none stated    Goals:  Short Term Goals: 4 weeks (progressing, not met)  - Patient will be able to stand for at least 30 minutes with minimal to no exacerbation of symptoms for improved tolerance to household and work related duties.  - Patient will be able to ambulate at least 1/2 mile with minimal to no exacerbation of symptoms for improved physical activity and return to PLOF.  - Patient will demonstrate improved Left Ankle range of motion by at least 10 degrees, especially into inversion for increased functional mobility.  - Patient will demonstrate improved LE strength, especially into ankle inversion by at least 1/2 grade via MMT for increased stability and support with functional tasks.     Long Term Goals: 8 weeks (progressing, not met)  - Patient will be able to stand for at least 1 hour with minimal to no exacerbation of symptoms for improved tolerance to household and work related duties.  - Patient will be able to ambulate at least 1 mile with minimal to no exacerbation of symptoms for improved physical activity and return to PLOF.  - Patient will demonstrate improved Left Ankle range of motion by at least 10 degrees, especially into eversion for increased functional mobility.  - Patient will demonstrate improved LE strength, especially into ankle eversion by at least 1/2 grade via MMT for increased stability and support with functional tasks.  - Patient will demonstrate improved FOTO score that is  greater than or equal to the predicted value for improved ability to perform ADL's. (MET: 1/29/24)  - Patient will demonstrate independence with Home Exercise Program for continued improvements outside the clinical setting.    Plan     Continue with established POC for improved functional mobility overall.    Dahlia Jasso, PT, DPT, Cert. DN

## 2024-01-29 ENCOUNTER — CLINICAL SUPPORT (OUTPATIENT)
Dept: REHABILITATION | Facility: HOSPITAL | Age: 59
End: 2024-01-29
Payer: COMMERCIAL

## 2024-01-29 DIAGNOSIS — R68.89 DECREASED FUNCTIONAL ACTIVITY TOLERANCE: ICD-10-CM

## 2024-01-29 DIAGNOSIS — R29.898 DECREASED STRENGTH OF LOWER EXTREMITY: Primary | ICD-10-CM

## 2024-01-29 DIAGNOSIS — M25.672 DECREASED RANGE OF MOTION OF LEFT ANKLE: ICD-10-CM

## 2024-01-29 PROCEDURE — 97110 THERAPEUTIC EXERCISES: CPT | Mod: PN

## 2024-01-29 PROCEDURE — 97112 NEUROMUSCULAR REEDUCATION: CPT | Mod: PN

## 2024-01-29 PROCEDURE — 97530 THERAPEUTIC ACTIVITIES: CPT | Mod: PN

## 2024-01-30 NOTE — PROGRESS NOTES
[facility]ERAP[facility] AND [facility]LL[facility]   Physical Therapy Treatment Note      Name: Christina Bunn  Clinic Number: [id number]    Therapy Diagnosis:   Encounter Diagnoses   Name Primary?    Decreased strength of lower extremity Yes    Decreased range of motion of left ankle     Decreased functional activity tolerance      Physician: Simon Whitlock DPM    Visit Date: 1/31/2024    Physician Orders: PT Eval and Treat  Medical Diagnosis from Referral: M25.572 (ICD-10-CM) - Sinus tarsitis of left foot  S92.025D (ICD-10-CM) - Closed nondisplaced fracture of anterior process of left calcaneus with routine healing, subsequent encounter  Evaluation Date: 1/17/2024  Authorization Period Expiration: 12/31/24  Plan of Care Expiration: 3/17/24  Progress Note Due: 2/17/24  Date of Surgery: N/A  Visit # / Visits authorized: 4 / 20 (1 / 1 Eval)  FOTO: 2 / 3     Precautions: Asthma      Time In: 7:00 AM  Time Out: 7:53 AM   Total Billable Time: 53 minutes    PTA Visit #: 0/5     Subjective     Patient reports: that she did have some soreness after last session but nothing too bad.    She was compliant with home exercise program.  Response to previous treatment: mild soreness that improved over time  Functional change: in progress - improved mobility    Pain: 0/10  Location: left ankle/foot    Objective      Objective Measures updated at progress report unless specified.     Treatment     Christina received the treatments listed below:      Therapeutic Exercises to develop strength, endurance, ROM, and flexibility for 17 minutes including:     Upright Bike x5 min (Level 3)  Patient Education and Home Exercise Program Review  Seated Plantar Fascia Rolls x2 min (L Only) - using therabar  Standing Plantar Fascia Slides x2 min (L only)  Standing Calf Stretch 10x10s (L only)  Standing Soleus Stretch 10x10s (L only)  Standing Heel Drops + Raises 2x10 reps B - off step  Long Sitting Hamstring Stretch 10x10s (L only)  Long Sitting Ankle DF, PF,  INV, EV with RTB 2x10 reps (L only)       Neuromuscular Re-education activities to improve: Balance, Coordination, Kinesthetic, Sense, Proprioception, and Posture for 23 minutes. The following activities were included:     Re-bounder Toss 3x10 reps + SLS on Foam (dodgeball) - barefoot (L only)  Standing Hip Abduction 3x10 reps B + RTB around ankles  Standing Hip Extensions 3x10 reps B + RTB around ankles  SLS + Bball Bounce/Catch x30 reps on Foam Pad (L)  RDL's x15 reps (L) - holding dowel      Therapeutic Activities to improve functional performance for 13 minutes, including:    Shuttle Press x2 min B (3 black cords, 1 red cord)  Shuttle Press x2 min U (1 black cord, 1 red cord) L only  Supine Hip Bridge 3x10 reps B + RTB around knees with Hip Abduction  Sit to Stands on Foam 2x15 reps + #10 KB    Patient Education and Home Exercises       Education provided:   - Home Exercise Program Review  - Post Exercise Soreness  - Maintaining a pain free range of motion with all activities  - Anatomy/Physiology of the Ankle and Foot and the surrounding musculature    Written Home Exercises Provided: Patient instructed to cont prior HEP. Exercises were reviewed and Christina was able to demonstrate them prior to the end of the session.  Christina demonstrated good  understanding of the education provided. See Electronic Medical Record under Patient Instructions for exercises provided during therapy sessions    Assessment     Stability and lower leg strengthening continue to be the main focal points of each session. Patient tolerated all activity well this date without exacerbation of symptoms/pain. Increased difficulty with single leg RDL's however, this did improve with increased reps/time. Will continue to progress patient as able next session.    Christina Is progressing well towards her goals.   Patient prognosis is Good.     Patient will continue to benefit from skilled outpatient physical therapy to address the deficits listed in the  problem list box on initial evaluation, provide pt/family education and to maximize pt's level of independence in the home and community environment.     Patient's spiritual, cultural and educational needs considered and pt agreeable to plan of care and goals.     Anticipated barriers to physical therapy: none stated    Goals:  Short Term Goals: 4 weeks (progressing, not met)  - Patient will be able to stand for at least 30 minutes with minimal to no exacerbation of symptoms for improved tolerance to household and work related duties.  - Patient will be able to ambulate at least 1/2 mile with minimal to no exacerbation of symptoms for improved physical activity and return to PLOF.  - Patient will demonstrate improved Left Ankle range of motion by at least 10 degrees, especially into inversion for increased functional mobility.  - Patient will demonstrate improved LE strength, especially into ankle inversion by at least 1/2 grade via MMT for increased stability and support with functional tasks.     Long Term Goals: 8 weeks (progressing, not met)  - Patient will be able to stand for at least 1 hour with minimal to no exacerbation of symptoms for improved tolerance to household and work related duties.  - Patient will be able to ambulate at least 1 mile with minimal to no exacerbation of symptoms for improved physical activity and return to PLOF.  - Patient will demonstrate improved Left Ankle range of motion by at least 10 degrees, especially into eversion for increased functional mobility.  - Patient will demonstrate improved LE strength, especially into ankle eversion by at least 1/2 grade via MMT for increased stability and support with functional tasks.  - Patient will demonstrate improved FOTO score that is greater than or equal to the predicted value for improved ability to perform ADL's. (MET: 1/29/24)  - Patient will demonstrate independence with Home Exercise Program for continued improvements outside the  clinical setting.    Plan     Continue with established POC for improved functional mobility overall.    Dahlia Jasso, PT, DPT, Cert. DN

## 2024-01-31 ENCOUNTER — CLINICAL SUPPORT (OUTPATIENT)
Dept: REHABILITATION | Facility: HOSPITAL | Age: 59
End: 2024-01-31
Payer: COMMERCIAL

## 2024-01-31 DIAGNOSIS — R68.89 DECREASED FUNCTIONAL ACTIVITY TOLERANCE: ICD-10-CM

## 2024-01-31 DIAGNOSIS — M25.672 DECREASED RANGE OF MOTION OF LEFT ANKLE: ICD-10-CM

## 2024-01-31 DIAGNOSIS — R29.898 DECREASED STRENGTH OF LOWER EXTREMITY: Primary | ICD-10-CM

## 2024-01-31 PROCEDURE — 97530 THERAPEUTIC ACTIVITIES: CPT | Mod: PN

## 2024-01-31 PROCEDURE — 97112 NEUROMUSCULAR REEDUCATION: CPT | Mod: PN

## 2024-01-31 PROCEDURE — 97110 THERAPEUTIC EXERCISES: CPT | Mod: PN

## 2024-02-06 NOTE — PROGRESS NOTES
[facility]   Physical Therapy Treatment Note      Name: Christina Bunn  Clinic Number: [id number]    Therapy Diagnosis:   No diagnosis found.    Physician: Simon Whitlock DPM    Visit Date: 2/14/2024    Physician Orders: PT Eval and Treat  Medical Diagnosis from Referral: M25.572 (ICD-10-CM) - Sinus tarsitis of left foot  S92.025D (ICD-10-CM) - Closed nondisplaced fracture of anterior process of left calcaneus with routine healing, subsequent encounter  Evaluation Date: 1/17/2024  Authorization Period Expiration: 12/31/24  Plan of Care Expiration: 3/17/24  Progress Note Due: 2/17/24  Date of Surgery: N/A  Visit # / Visits authorized: 5 / 20 (1 / 1 Eval)  FOTO: 2 / 3     Precautions: Asthma      Time In: 7:00 AM  Time Out: 7:53 AM   Total Billable Time: 53 minutes    PTA Visit #: 0/5     Subjective     Patient reports: ***.    She was compliant with home exercise program.  Response to previous treatment: mild soreness that improved over time  Functional change: in progress - improved mobility    Pain: 0/10  Location: left ankle/foot    Objective      Objective Measures updated at progress report unless specified.     Treatment     Christina received the treatments listed below:      Therapeutic Exercises to develop strength, endurance, ROM, and flexibility for 17 minutes including:     Upright Bike x5 min (Level 3)  Patient Education and Home Exercise Program Review  Seated Plantar Fascia Rolls x2 min (L Only) - using therabar  Standing Plantar Fascia Slides x2 min (L only)  Standing Calf Stretch 10x10s (L only)  Standing Soleus Stretch 10x10s (L only)  Standing Heel Drops + Raises 2x10 reps B - off step  Long Sitting Hamstring Stretch 10x10s (L only)  Long Sitting Ankle DF, PF, INV, EV with RTB 2x10 reps (L only)       Neuromuscular Re-education activities to improve: Balance, Coordination, Kinesthetic, Sense, Proprioception, and Posture for 23 minutes. The following activities were  included:     Re-bounder Toss 3x10 reps + SLS on Foam (dodgeball) - barefoot (L only)  Standing Hip Abduction 3x10 reps B + RTB around ankles  Standing Hip Extensions 3x10 reps B + RTB around ankles  SLS + Bball Bounce/Catch x30 reps on Foam Pad (L)  RDL's x15 reps (L) - holding dowel      Therapeutic Activities to improve functional performance for 13 minutes, including:    Shuttle Press x2 min B (3 black cords, 1 red cord)  Shuttle Press x2 min U (1 black cord, 1 red cord) L only  Supine Hip Bridge 3x10 reps B + RTB around knees with Hip Abduction  Sit to Stands on Foam 2x15 reps + #10 KB    Patient Education and Home Exercises       Education provided:   - Home Exercise Program Review  - Post Exercise Soreness  - Maintaining a pain free range of motion with all activities  - Anatomy/Physiology of the Ankle and Foot and the surrounding musculature    Written Home Exercises Provided: Patient instructed to cont prior HEP. Exercises were reviewed and Christina was able to demonstrate them prior to the end of the session.  Christina demonstrated good  understanding of the education provided. See Electronic Medical Record under Patient Instructions for exercises provided during therapy sessions    Assessment     ***.    Christina Is progressing well towards her goals.   Patient prognosis is Good.     Patient will continue to benefit from skilled outpatient physical therapy to address the deficits listed in the problem list box on initial evaluation, provide pt/family education and to maximize pt's level of independence in the home and community environment.     Patient's spiritual, cultural and educational needs considered and pt agreeable to plan of care and goals.     Anticipated barriers to physical therapy: none stated    Goals:  Short Term Goals: 4 weeks (progressing, not met)  - Patient will be able to stand for at least 30 minutes with minimal to no exacerbation of symptoms for improved tolerance to household and work related  duties.  - Patient will be able to ambulate at least 1/2 mile with minimal to no exacerbation of symptoms for improved physical activity and return to PLOF.  - Patient will demonstrate improved Left Ankle range of motion by at least 10 degrees, especially into inversion for increased functional mobility.  - Patient will demonstrate improved LE strength, especially into ankle inversion by at least 1/2 grade via MMT for increased stability and support with functional tasks.     Long Term Goals: 8 weeks (progressing, not met)  - Patient will be able to stand for at least 1 hour with minimal to no exacerbation of symptoms for improved tolerance to household and work related duties.  - Patient will be able to ambulate at least 1 mile with minimal to no exacerbation of symptoms for improved physical activity and return to PLOF.  - Patient will demonstrate improved Left Ankle range of motion by at least 10 degrees, especially into eversion for increased functional mobility.  - Patient will demonstrate improved LE strength, especially into ankle eversion by at least 1/2 grade via MMT for increased stability and support with functional tasks.  - Patient will demonstrate improved FOTO score that is greater than or equal to the predicted value for improved ability to perform ADL's. (MET: 1/29/24)  - Patient will demonstrate independence with Home Exercise Program for continued improvements outside the clinical setting.    Plan     Continue with established POC for improved functional mobility overall.    Dahlia Jasso, PT, DPT, Cert. DN

## 2024-02-06 NOTE — PROGRESS NOTES
[facility]   Physical Therapy Treatment Note      Name: Christina Bunn  Clinic Number: [id number]    Therapy Diagnosis:   Encounter Diagnoses   Name Primary?    Decreased strength of lower extremity Yes    Decreased range of motion of left ankle     Decreased functional activity tolerance        Physician: Simon Whitlock DPM    Visit Date: 2/7/2024    Physician Orders: PT Eval and Treat  Medical Diagnosis from Referral: M25.572 (ICD-10-CM) - Sinus tarsitis of left foot  S92.025D (ICD-10-CM) - Closed nondisplaced fracture of anterior process of left calcaneus with routine healing, subsequent encounter  Evaluation Date: 1/17/2024  Authorization Period Expiration: 12/31/24  Plan of Care Expiration: 3/17/24  Progress Note Due: 2/17/24  Date of Surgery: N/A  Visit # / Visits authorized: 5 / 20 (1 / 1 Eval)  FOTO: 2 / 3     Precautions: Asthma      Time In: 7:00 AM  Time Out: 7:53 AM   Total Billable Time: 53 minutes    PTA Visit #: 0/5     Subjective     Patient reports: that she has been dealing with the flu over the last few days. She feels like she is getting back to normal. Started doing more low impact aerobics and is walking a little more each time.    She was compliant with home exercise program.  Response to previous treatment: mild soreness that improved over time  Functional change: in progress - improved mobility    Pain: 0/10  Location: left ankle/foot    Objective      Objective Measures updated at progress report unless specified.     Treatment     Christina received the treatments listed below:      Therapeutic Exercises to develop strength, endurance, ROM, and flexibility for 17 minutes including:     Upright Bike x5 min (Level 3)  Patient Education and Home Exercise Program Review  Seated Plantar Fascia Rolls x2 min (L Only) - using therabar  Standing Plantar Fascia Slides x2 min (L only)  Standing Calf Stretch 10x10s (L only)  Standing Soleus Stretch 10x10s (L only)  Standing  Heel Drops + Raises 2x10 reps B - off step  Long Sitting Hamstring Stretch 10x10s (L only)  Long Sitting Ankle DF, PF, INV, EV with RTB 2x10 reps (L only)       Neuromuscular Re-education activities to improve: Balance, Coordination, Kinesthetic, Sense, Proprioception, and Posture for 23 minutes. The following activities were included:     Re-bounder Toss 3x10 reps + SLS on Foam (dodgeball) - barefoot (L only)  Standing Hip Abduction 3x10 reps B + RTB around ankles  Standing Hip Extensions 3x10 reps B + RTB around ankles  SLS + Bball Bounce/Catch x30 reps on Foam Pad (L)  RDL's x10 reps (L) - holding dowel  SLS TRX Sit to Stands 2x10 reps + 20 in box    Therapeutic Activities to improve functional performance for 13 minutes, including:    Shuttle Press x2 min B (3 black cords, 1 red cord)  Shuttle Press x2 min U (1 black cord, 1 red cord) L only  Shuttle Press Heel Raises x1 min   Supine Single Leg Hip Bridge 2x10 reps B  Sit to Stands on Foam 2x15 reps + #10 KB    Patient Education and Home Exercises       Education provided:   - Home Exercise Program Review  - Post Exercise Soreness  - Maintaining a pain free range of motion with all activities  - Anatomy/Physiology of the Ankle and Foot and the surrounding musculature    Written Home Exercises Provided: Patient instructed to cont prior HEP. Exercises were reviewed and Christina was able to demonstrate them prior to the end of the session.  Christina demonstrated good  understanding of the education provided. See Electronic Medical Record under Patient Instructions for exercises provided during therapy sessions    Assessment     Patient is continuing to progress very well with therapy. No pain reported during treatment. Plan to potentially re-assess and discharge the patient next week.    Christina Is progressing well towards her goals.   Patient prognosis is Good.     Patient will continue to benefit from skilled outpatient physical therapy to address the deficits listed in the  problem list box on initial evaluation, provide pt/family education and to maximize pt's level of independence in the home and community environment.     Patient's spiritual, cultural and educational needs considered and pt agreeable to plan of care and goals.     Anticipated barriers to physical therapy: none stated    Goals:  Short Term Goals: 4 weeks (progressing, not met)  - Patient will be able to stand for at least 30 minutes with minimal to no exacerbation of symptoms for improved tolerance to household and work related duties.  - Patient will be able to ambulate at least 1/2 mile with minimal to no exacerbation of symptoms for improved physical activity and return to PLOF.  - Patient will demonstrate improved Left Ankle range of motion by at least 10 degrees, especially into inversion for increased functional mobility.  - Patient will demonstrate improved LE strength, especially into ankle inversion by at least 1/2 grade via MMT for increased stability and support with functional tasks.     Long Term Goals: 8 weeks (progressing, not met)  - Patient will be able to stand for at least 1 hour with minimal to no exacerbation of symptoms for improved tolerance to household and work related duties.  - Patient will be able to ambulate at least 1 mile with minimal to no exacerbation of symptoms for improved physical activity and return to PLOF.  - Patient will demonstrate improved Left Ankle range of motion by at least 10 degrees, especially into eversion for increased functional mobility.  - Patient will demonstrate improved LE strength, especially into ankle eversion by at least 1/2 grade via MMT for increased stability and support with functional tasks.  - Patient will demonstrate improved FOTO score that is greater than or equal to the predicted value for improved ability to perform ADL's. (MET: 1/29/24)  - Patient will demonstrate independence with Home Exercise Program for continued improvements outside the  clinical setting.    Plan     Continue with established POC for improved functional mobility overall.    Dahlia Jasso, PT, DPT, Cert. DN

## 2024-02-07 ENCOUNTER — CLINICAL SUPPORT (OUTPATIENT)
Dept: REHABILITATION | Facility: HOSPITAL | Age: 59
End: 2024-02-07
Payer: COMMERCIAL

## 2024-02-07 DIAGNOSIS — M25.672 DECREASED RANGE OF MOTION OF LEFT ANKLE: ICD-10-CM

## 2024-02-07 DIAGNOSIS — R29.898 DECREASED STRENGTH OF LOWER EXTREMITY: Primary | ICD-10-CM

## 2024-02-07 DIAGNOSIS — R68.89 DECREASED FUNCTIONAL ACTIVITY TOLERANCE: ICD-10-CM

## 2024-02-07 PROCEDURE — 97110 THERAPEUTIC EXERCISES: CPT | Mod: PN

## 2024-02-07 PROCEDURE — 97530 THERAPEUTIC ACTIVITIES: CPT | Mod: PN

## 2024-02-07 PROCEDURE — 97112 NEUROMUSCULAR REEDUCATION: CPT | Mod: PN

## 2024-02-14 ENCOUNTER — CLINICAL SUPPORT (OUTPATIENT)
Dept: REHABILITATION | Facility: HOSPITAL | Age: 59
End: 2024-02-14
Payer: COMMERCIAL

## 2024-02-14 ENCOUNTER — OFFICE VISIT (OUTPATIENT)
Dept: PODIATRY | Facility: CLINIC | Age: 59
End: 2024-02-14
Payer: COMMERCIAL

## 2024-02-14 VITALS — HEIGHT: 67 IN | BODY MASS INDEX: 29.51 KG/M2 | WEIGHT: 188 LBS

## 2024-02-14 DIAGNOSIS — R29.898 DECREASED STRENGTH OF LOWER EXTREMITY: Primary | ICD-10-CM

## 2024-02-14 DIAGNOSIS — S92.025D CLOSED NONDISPLACED FRACTURE OF ANTERIOR PROCESS OF LEFT CALCANEUS WITH ROUTINE HEALING, SUBSEQUENT ENCOUNTER: ICD-10-CM

## 2024-02-14 DIAGNOSIS — M25.672 DECREASED RANGE OF MOTION OF LEFT ANKLE: ICD-10-CM

## 2024-02-14 DIAGNOSIS — M25.572 SINUS TARSITIS OF LEFT FOOT: Primary | ICD-10-CM

## 2024-02-14 DIAGNOSIS — R68.89 DECREASED FUNCTIONAL ACTIVITY TOLERANCE: ICD-10-CM

## 2024-02-14 PROCEDURE — 99212 OFFICE O/P EST SF 10 MIN: CPT | Mod: S$GLB,,, | Performed by: STUDENT IN AN ORGANIZED HEALTH CARE EDUCATION/TRAINING PROGRAM

## 2024-02-14 PROCEDURE — 97530 THERAPEUTIC ACTIVITIES: CPT | Mod: PN

## 2024-02-14 PROCEDURE — 3008F BODY MASS INDEX DOCD: CPT | Mod: CPTII,S$GLB,, | Performed by: STUDENT IN AN ORGANIZED HEALTH CARE EDUCATION/TRAINING PROGRAM

## 2024-02-14 PROCEDURE — 1159F MED LIST DOCD IN RCRD: CPT | Mod: CPTII,S$GLB,, | Performed by: STUDENT IN AN ORGANIZED HEALTH CARE EDUCATION/TRAINING PROGRAM

## 2024-02-14 PROCEDURE — 1160F RVW MEDS BY RX/DR IN RCRD: CPT | Mod: CPTII,S$GLB,, | Performed by: STUDENT IN AN ORGANIZED HEALTH CARE EDUCATION/TRAINING PROGRAM

## 2024-02-14 PROCEDURE — 99999 PR PBB SHADOW E&M-EST. PATIENT-LVL III: CPT | Mod: PBBFAC,,, | Performed by: STUDENT IN AN ORGANIZED HEALTH CARE EDUCATION/TRAINING PROGRAM

## 2024-02-14 PROCEDURE — 97110 THERAPEUTIC EXERCISES: CPT | Mod: PN

## 2024-02-14 NOTE — PLAN OF CARE
[facility]   Physical Therapy Treatment Note      Name: Christina Bunn  Clinic Number: [id number]    Therapy Diagnosis:   Encounter Diagnoses   Name Primary?    Decreased strength of lower extremity Yes    Decreased range of motion of left ankle     Decreased functional activity tolerance      Physician: Simon Whitlock DPM    Visit Date: 2/14/2024    Physician Orders: PT Eval and Treat  Medical Diagnosis from Referral: M25.572 (ICD-10-CM) - Sinus tarsitis of left foot  S92.025D (ICD-10-CM) - Closed nondisplaced fracture of anterior process of left calcaneus with routine healing, subsequent encounter  Evaluation Date: 1/17/2024  Authorization Period Expiration: 12/31/24  Plan of Care Expiration: 3/17/24  Progress Note Due: 2/17/24  Date of Surgery: N/A  Visit # / Visits authorized: 6 / 20 (1 / 1 Eval)  FOTO: 3 / 3     Precautions: Asthma      Time In: 7:00 AM  Time Out: 7:38 AM   Total Billable Time: 38 minutes    PTA Visit #: 0/5     Date of Last visit: 2/14/24  Total Visits Received: 7    Subjective     Patient reports: that she is feeling pretty much back to normal. No problems at home. Feels comfortable continuing to progress her exercises at home moving forward. No pain.    She was compliant with home exercise program.  Response to previous treatment: mild soreness that improved over time  Functional change: improved mobility and decreased pain    Pain: 0/10  Location: left ankle/foot    Objective      Objective Measures updated at progress report unless specified.     Posture: FAIR - no obvious signs of distress. Very pleasant WF.     Gait: non antalgic in nature - no boot. No AD. Normal ceasar and step length.     Functional Tests:   SLS EO: ~5-10s B  Squat: Fair Mechanics - no signs of knee valgus     Ankle Active/Passive Range of Motion:   Ankle Right Left   DF 5 5   Plantarflexion 45 45   Inversion 35 35   Eversion 30 30      Knee Passive Range of Motion:    Right  Left     Flexion 120 120   Extension 0 0      Hip Passive Range of Motion: WFL B        Lower Extremity Strength    Right  Left    Dorsiflexion 5/5 5/5   Plantarflexion  5/5 5/5   Inversion 5/5 5/5   Eversion 5/5 5/5   Hip extension 5/5 5/5   PGM 5/5 5/5         Special Tests:    Right Left   Anterior Drawer Test - -   Varus Stress Test     - -   Valgus Stress Test  - -      Joint Mobility: WFL at B talocrural joints.     Palpation: Mild TTP along the distal aspect of the lateral malleolus of the left ankle.     Neural provocation:  None present B     Edema:  Girth Measurement Fig-8   Right 54 cm   Left 55 cm      Intake Outcome Measure for FOTO Foot Survey     Therapist reviewed FOTO scores for Christina Bunn on 2/14/2024.   FOTO report - see Media section or FOTO account episode details.     Intake Score: 80     Treatment     Christina received the treatments listed below:      Therapeutic Exercises to develop strength, endurance, ROM, and flexibility for 28 minutes including:     Re-assessment  Upright Bike x5 min (Level 3)  Patient Education and Home Exercise Program Review  Standing Calf Stretch 10x10s (L only)  Standing Soleus Stretch 10x10s (L only)  Standing Heel Drops + Raises 2x10 reps B - off step  Re-bounder Toss 3x10 reps + SLS on Foam (dodgeball) - barefoot (L only)       Neuromuscular Re-education activities to improve: Balance, Coordination, Kinesthetic, Sense, Proprioception, and Posture for 0 minutes. The following activities were included:       Therapeutic Activities to improve functional performance for 10 minutes, including:    Shuttle Press x2 min B (3 black cords, 1 red cord)  Shuttle Press x2 min U (1 black cord, 1 red cord) L only  Shuttle Press Heel Raises x1 min     Patient Education and Home Exercises       Education provided:   - Home Exercise Program Review  - Post Exercise Soreness  - Maintaining a pain free range of motion with all activities  - Anatomy/Physiology of the Ankle and Foot and the  surrounding musculature    Written Home Exercises Provided: Patient instructed to cont prior HEP. Exercises were reviewed and Christina was able to demonstrate them prior to the end of the session.  Christina demonstrated good  understanding of the education provided. See Electronic Medical Record under Patient Instructions for exercises provided during therapy sessions    Assessment     Upon re-assessment, noted patient to demonstrate improved ankle range of motion into all planes, decreased to no pain, functional ankle strength, and good tolerance to functional activities. Squat mechanics were appropriate and single leg stability was good without complication. Patient is independent with Home Exercise Program. At this point, the patient will continue to benefit from skilled outpatient PT services.    Discharge reason: Patient is now asymptomatic and Patient requested discharge    Discharge FOTO Score: 80    Christina Is progressing well towards her goals.   Patient prognosis is Good.     Patient will continue to benefit from skilled outpatient physical therapy to address the deficits listed in the problem list box on initial evaluation, provide pt/family education and to maximize pt's level of independence in the home and community environment.     Patient's spiritual, cultural and educational needs considered and pt agreeable to plan of care and goals.     Anticipated barriers to physical therapy: none stated    Goals:  Short Term Goals: 4 weeks   - Patient will be able to stand for at least 30 minutes with minimal to no exacerbation of symptoms for improved tolerance to household and work related duties. (MET: 2/14/24)  - Patient will be able to ambulate at least 1/2 mile with minimal to no exacerbation of symptoms for improved physical activity and return to PLOF. (MET: 2/14/24)  - Patient will demonstrate improved Left Ankle range of motion by at least 10 degrees, especially into inversion for increased functional mobility.  (MET: 2/14/24)  - Patient will demonstrate improved LE strength, especially into ankle inversion by at least 1/2 grade via MMT for increased stability and support with functional tasks. (MET: 2/14/24)     Long Term Goals: 8 weeks  - Patient will be able to stand for at least 1 hour with minimal to no exacerbation of symptoms for improved tolerance to household and work related duties. (MET: 2/14/24)  - Patient will be able to ambulate at least 1 mile with minimal to no exacerbation of symptoms for improved physical activity and return to PLOF. (MET: 2/14/24)  - Patient will demonstrate improved Left Ankle range of motion by at least 10 degrees, especially into eversion for increased functional mobility. (MET: 2/14/24)  - Patient will demonstrate improved LE strength, especially into ankle eversion by at least 1/2 grade via MMT for increased stability and support with functional tasks. (MET: 2/14/24)  - Patient will demonstrate improved FOTO score that is greater than or equal to the predicted value for improved ability to perform ADL's. (MET: 1/29/24)  - Patient will demonstrate independence with Home Exercise Program for continued improvements outside the clinical setting. (MET: 2/14/24)    Plan     This patient is discharged from skilled outpatient Physical Therapy services.    Dahlia Jasso, PT, DPT, Cert. DN

## 2024-02-14 NOTE — PROGRESS NOTES
Subjective:      Patient ID: Christina Bunn is a 58 y.o. female.    Chief Complaint: Follow-up    Ms. Bunn presents today with complaints of chronic left foot pain with activities and also with rest. She reports an injury to the left foot in May of this when she fell down some stairs. She had immediate pain to the lateral midfoot and was unable to walk on the foot for some time. She saw an urgent care where they saw no fractures. She later saw an Orthopedic surgeon in [place] that reportedly saw fractures to the left 5th met base, talus and 4th toe.     1/11/24:  MRI reviewed with healing calc fracture, sinus tarsi signal, cuboid signal. Pain is improving in the boot.     2/14/24:   F/u today for L foot. She is feeling great.     Review of Systems   Musculoskeletal:         Left foot pain   All other systems reviewed and are negative.          Objective:      Physical Exam  Cardiovascular:      Pulses:           Dorsalis pedis pulses are 2+ on the left side.        Posterior tibial pulses are 2+ on the left side.   Feet:      Left foot:      Skin integrity: Skin integrity normal.      Toenail Condition: Left toenails are normal.      Comments: Left foot:  Overall rectus foot type. Tenderness is resolved. No pain with provocation of the peroneals. Still some pain at the STJ              Assessment:       Encounter Diagnoses   Name Primary?    Sinus tarsitis of left foot Yes    Closed nondisplaced fracture of anterior process of left calcaneus with routine healing, subsequent encounter            Plan:       Christina was seen today for follow-up.    Diagnoses and all orders for this visit:    Sinus tarsitis of left foot    Closed nondisplaced fracture of anterior process of left calcaneus with routine healing, subsequent encounter        I counseled the patient on her conditions, their implications and medical management.    Doing better overall. Follow up as needed.      iSmon Whitlock DPM

## 2024-02-14 NOTE — PROGRESS NOTES
[facility]   Physical Therapy Treatment Note      Name: Christina Bunn  Clinic Number: [id number]    Therapy Diagnosis:   Encounter Diagnoses   Name Primary?    Decreased strength of lower extremity Yes    Decreased range of motion of left ankle     Decreased functional activity tolerance      Physician: Simon Whitlock DPM    Visit Date: 2/14/2024    Physician Orders: PT Eval and Treat  Medical Diagnosis from Referral: M25.572 (ICD-10-CM) - Sinus tarsitis of left foot  S92.025D (ICD-10-CM) - Closed nondisplaced fracture of anterior process of left calcaneus with routine healing, subsequent encounter  Evaluation Date: 1/17/2024  Authorization Period Expiration: 12/31/24  Plan of Care Expiration: 3/17/24  Progress Note Due: 2/17/24  Date of Surgery: N/A  Visit # / Visits authorized: 6 / 20 (1 / 1 Eval)  FOTO: 3 / 3     Precautions: Asthma      Time In: 7:00 AM  Time Out: 7:38 AM   Total Billable Time: 38 minutes    PTA Visit #: 0/5     Date of Last visit: 2/14/24  Total Visits Received: 7    Subjective     Patient reports: that she is feeling pretty much back to normal. No problems at home. Feels comfortable continuing to progress her exercises at home moving forward. No pain.    She was compliant with home exercise program.  Response to previous treatment: mild soreness that improved over time  Functional change: improved mobility and decreased pain    Pain: 0/10  Location: left ankle/foot    Objective      Objective Measures updated at progress report unless specified.     Posture: FAIR - no obvious signs of distress. Very pleasant WF.     Gait: non antalgic in nature - no boot. No AD. Normal ceasar and step length.     Functional Tests:   SLS EO: ~5-10s B  Squat: Fair Mechanics - no signs of knee valgus     Ankle Active/Passive Range of Motion:   Ankle Right Left   DF 5 5   Plantarflexion 45 45   Inversion 35 35   Eversion 30 30      Knee Passive Range of Motion:    Right  Left     Flexion 120 120   Extension 0 0      Hip Passive Range of Motion: WFL B        Lower Extremity Strength    Right  Left    Dorsiflexion 5/5 5/5   Plantarflexion  5/5 5/5   Inversion 5/5 5/5   Eversion 5/5 5/5   Hip extension 5/5 5/5   PGM 5/5 5/5         Special Tests:    Right Left   Anterior Drawer Test - -   Varus Stress Test     - -   Valgus Stress Test  - -      Joint Mobility: WFL at B talocrural joints.     Palpation: Mild TTP along the distal aspect of the lateral malleolus of the left ankle.     Neural provocation:  None present B     Edema:  Girth Measurement Fig-8   Right 54 cm   Left 55 cm      Intake Outcome Measure for FOTO Foot Survey     Therapist reviewed FOTO scores for Christina Bunn on 2/14/2024.   FOTO report - see Media section or FOTO account episode details.     Intake Score: 80     Treatment     Christina received the treatments listed below:      Therapeutic Exercises to develop strength, endurance, ROM, and flexibility for 28 minutes including:     Re-assessment  Upright Bike x5 min (Level 3)  Patient Education and Home Exercise Program Review  Standing Calf Stretch 10x10s (L only)  Standing Soleus Stretch 10x10s (L only)  Standing Heel Drops + Raises 2x10 reps B - off step  Re-bounder Toss 3x10 reps + SLS on Foam (dodgeball) - barefoot (L only)       Neuromuscular Re-education activities to improve: Balance, Coordination, Kinesthetic, Sense, Proprioception, and Posture for 0 minutes. The following activities were included:       Therapeutic Activities to improve functional performance for 10 minutes, including:    Shuttle Press x2 min B (3 black cords, 1 red cord)  Shuttle Press x2 min U (1 black cord, 1 red cord) L only  Shuttle Press Heel Raises x1 min     Patient Education and Home Exercises       Education provided:   - Home Exercise Program Review  - Post Exercise Soreness  - Maintaining a pain free range of motion with all activities  - Anatomy/Physiology of the Ankle and Foot and the  surrounding musculature    Written Home Exercises Provided: Patient instructed to cont prior HEP. Exercises were reviewed and Christina was able to demonstrate them prior to the end of the session.  Christina demonstrated good  understanding of the education provided. See Electronic Medical Record under Patient Instructions for exercises provided during therapy sessions    Assessment     Upon re-assessment, noted patient to demonstrate improved ankle range of motion into all planes, decreased to no pain, functional ankle strength, and good tolerance to functional activities. Squat mechanics were appropriate and single leg stability was good without complication. Patient is independent with Home Exercise Program. At this point, the patient will continue to benefit from skilled outpatient PT services.    Discharge reason: Patient is now asymptomatic and Patient requested discharge    Discharge FOTO Score: 80    Christina Is progressing well towards her goals.   Patient prognosis is Good.     Patient will continue to benefit from skilled outpatient physical therapy to address the deficits listed in the problem list box on initial evaluation, provide pt/family education and to maximize pt's level of independence in the home and community environment.     Patient's spiritual, cultural and educational needs considered and pt agreeable to plan of care and goals.     Anticipated barriers to physical therapy: none stated    Goals:  Short Term Goals: 4 weeks   - Patient will be able to stand for at least 30 minutes with minimal to no exacerbation of symptoms for improved tolerance to household and work related duties. (MET: 2/14/24)  - Patient will be able to ambulate at least 1/2 mile with minimal to no exacerbation of symptoms for improved physical activity and return to PLOF. (MET: 2/14/24)  - Patient will demonstrate improved Left Ankle range of motion by at least 10 degrees, especially into inversion for increased functional mobility.  (MET: 2/14/24)  - Patient will demonstrate improved LE strength, especially into ankle inversion by at least 1/2 grade via MMT for increased stability and support with functional tasks. (MET: 2/14/24)     Long Term Goals: 8 weeks  - Patient will be able to stand for at least 1 hour with minimal to no exacerbation of symptoms for improved tolerance to household and work related duties. (MET: 2/14/24)  - Patient will be able to ambulate at least 1 mile with minimal to no exacerbation of symptoms for improved physical activity and return to PLOF. (MET: 2/14/24)  - Patient will demonstrate improved Left Ankle range of motion by at least 10 degrees, especially into eversion for increased functional mobility. (MET: 2/14/24)  - Patient will demonstrate improved LE strength, especially into ankle eversion by at least 1/2 grade via MMT for increased stability and support with functional tasks. (MET: 2/14/24)  - Patient will demonstrate improved FOTO score that is greater than or equal to the predicted value for improved ability to perform ADL's. (MET: 1/29/24)  - Patient will demonstrate independence with Home Exercise Program for continued improvements outside the clinical setting. (MET: 2/14/24)    Plan     This patient is discharged from skilled outpatient Physical Therapy services.    Dahlia Jasso, PT, DPT, Cert. DN

## 2024-05-18 ENCOUNTER — PATIENT MESSAGE (OUTPATIENT)
Dept: FAMILY MEDICINE | Facility: CLINIC | Age: 59
End: 2024-05-18
Payer: COMMERCIAL

## 2024-06-03 DIAGNOSIS — J45.20 MILD INTERMITTENT ASTHMA WITHOUT COMPLICATION: ICD-10-CM

## 2024-06-03 RX ORDER — FLUTICASONE PROPIONATE AND SALMETEROL 100; 50 UG/1; UG/1
POWDER RESPIRATORY (INHALATION)
Qty: 60 EACH | Refills: 3 | Status: SHIPPED | OUTPATIENT
Start: 2024-06-03

## 2024-06-05 DIAGNOSIS — Z79.899 ENCOUNTER FOR LONG-TERM (CURRENT) USE OF MEDICATIONS: ICD-10-CM

## 2024-06-06 RX ORDER — ALBUTEROL SULFATE 90 UG/1
2 AEROSOL, METERED RESPIRATORY (INHALATION) EVERY 6 HOURS PRN
Qty: 54 G | Refills: 0 | Status: SHIPPED | OUTPATIENT
Start: 2024-06-06

## 2024-06-06 NOTE — TELEPHONE ENCOUNTER
Refill Routing Note   Medication(s) are not appropriate for processing by [facility] for the following reason(s):        Due for refill >6 months ago  Required vitals outdated    [facility] action(s):  Defer     Requires labs : Yes             Appointments  past 12m or future 3m with PCP    Date Provider   Last Visit   1/25/2024 Nathaniel Thomas MD   Next Visit   Visit date not found Nathaniel Thomas MD   ED visits in past 90 days: 0        Note composed:7:32 AM 06/06/2024

## 2024-06-06 NOTE — TELEPHONE ENCOUNTER
Care Due:                  Date            Visit Type   Department     Provider  --------------------------------------------------------------------------------                                ESTABLISHED                              PATIENT -    [facility]  Last Visit: 01-      [facility]       Nathaniel Thomas  Next Visit: None Scheduled  None         None Found                                                            Last  Test          Frequency    Reason                     Performed    Due Date  --------------------------------------------------------------------------------    CBC.........  12 months..  famciclovir..............  Not Found    Overdue    Cr..........  12 months..  famciclovir..............  Not Found    Overdue    [employer] Embedded Care Due Messages. Reference number: [id number].   6/05/2024 7:07:06 PM CDT

## 2024-09-11 ENCOUNTER — PATIENT MESSAGE (OUTPATIENT)
Dept: FAMILY MEDICINE | Facility: CLINIC | Age: 59
End: 2024-09-11
Payer: COMMERCIAL

## 2024-10-15 DIAGNOSIS — J45.20 MILD INTERMITTENT ASTHMA WITHOUT COMPLICATION: ICD-10-CM

## 2024-10-15 RX ORDER — FLUTICASONE PROPIONATE AND SALMETEROL 100; 50 UG/1; UG/1
POWDER RESPIRATORY (INHALATION)
Qty: 180 EACH | Refills: 0 | Status: SHIPPED | OUTPATIENT
Start: 2024-10-15

## 2024-10-15 NOTE — TELEPHONE ENCOUNTER
Unable to retrieve patient chart and identify care due.  [facility] Embedded Care Due Messages. Reference number: [id number].   10/15/2024 3:40:16 PM CDT

## 2024-10-15 NOTE — TELEPHONE ENCOUNTER
Refill Routing Note   Medication(s) are not appropriate for processing by [facility] for the following reason(s):        Required labs outdated-heart rate    [facility] action(s):  Defer               Appointments  past 12m or future 3m with PCP    Date Provider   Last Visit   1/25/2024 Nathaniel Thomas MD   Next Visit   Visit date not found Nathaniel Thomas MD   ED visits in past 90 days: 0        Note composed:3:40 PM 10/15/2024

## 2024-11-22 ENCOUNTER — PATIENT MESSAGE (OUTPATIENT)
Dept: PODIATRY | Facility: CLINIC | Age: 59
End: 2024-11-22
Payer: COMMERCIAL

## 2025-01-06 DIAGNOSIS — G47.00 INSOMNIA, UNSPECIFIED TYPE: ICD-10-CM

## 2025-01-06 RX ORDER — TRAZODONE HYDROCHLORIDE 50 MG/1
50 TABLET ORAL NIGHTLY
Qty: 90 TABLET | Refills: 0 | Status: SHIPPED | OUTPATIENT
Start: 2025-01-06

## 2025-01-06 NOTE — TELEPHONE ENCOUNTER
Care Due:                  Date            Visit Type   Department     Provider  --------------------------------------------------------------------------------                                ESTABLISHED                              PATIENT -    [facility] FAMILY  Last Visit: 01-      [facility]       Nathaniel Thomas  Next Visit: None Scheduled  None         None Found                                                            Last  Test          Frequency    Reason                     Performed    Due Date  --------------------------------------------------------------------------------    CBC.........  12 months..  famciclovir..............  Not Found    Overdue    Cr..........  12 months..  famciclovir..............  Not Found    Overdue    [employer] Embedded Care Due Messages. Reference number: [id number].   1/06/2025 1:40:15 AM CST

## 2025-01-06 NOTE — TELEPHONE ENCOUNTER
Provider Staff:  Action required for this patient    Requires labs      Please see care gap opportunities below in Care Due Message.    Thanks!  [facility]     Appointments      Date Provider   Last Visit   1/25/2024 Nathaniel Thomas MD   Next Visit   Visit date not found Nathaniel Thomas MD     Refill Decision Note   Christina Bunn  is requesting a refill authorization.  Brief Assessment and Rationale for Refill:  Approve     Medication Therapy Plan:         Comments:     Note composed:10:40 AM 01/06/2025

## 2025-01-20 DIAGNOSIS — J45.20 MILD INTERMITTENT ASTHMA WITHOUT COMPLICATION: ICD-10-CM

## 2025-01-20 NOTE — TELEPHONE ENCOUNTER
Care Due:                  Date            Visit Type   Department     Provider  --------------------------------------------------------------------------------                                ESTABLISHED                              PATIENT -    [facility] FAMILY  Last Visit: 01-      [facility]      MEDICINE       Nathaniel Thomas  Next Visit: None Scheduled  None         None Found                                                            Last  Test          Frequency    Reason                     Performed    Due Date  --------------------------------------------------------------------------------    Office Visit  15 months..  famciclovir..............  01- 04-    [facility] Embedded Care Due Messages. Reference number: [id number].   1/20/2025 8:33:33 AM CST   Clindamycin Counseling: I counseled the patient regarding use of clindamycin as an antibiotic for prophylactic and/or therapeutic purposes. Clindamycin is active against numerous classes of bacteria, including skin bacteria. Side effects may include nausea, diarrhea, gastrointestinal upset, rash, hives, yeast infections, and in rare cases, colitis.

## 2025-01-21 NOTE — TELEPHONE ENCOUNTER
Refill Routing Note   Medication(s) are not appropriate for processing by [facility] for the following reason(s):        Required vitals outdated    [facility] action(s):  Defer   Requires appointment : Yes               Appointments  past 12m or future 3m with PCP    Date Provider   Last Visit   1/25/2024 Nathaniel Thomas MD   Next Visit   Visit date not found Nathaniel Thomas MD   ED visits in past 90 days: 0        Note composed:10:18 AM 01/21/2025

## 2025-01-22 RX ORDER — FLUTICASONE PROPIONATE AND SALMETEROL 100; 50 UG/1; UG/1
POWDER RESPIRATORY (INHALATION)
Qty: 60 EACH | Refills: 3 | Status: SHIPPED | OUTPATIENT
Start: 2025-01-22

## 2025-03-14 DIAGNOSIS — J45.20 MILD INTERMITTENT ASTHMA WITHOUT COMPLICATION: ICD-10-CM

## 2025-03-14 RX ORDER — FLUTICASONE PROPIONATE AND SALMETEROL 100; 50 UG/1; UG/1
POWDER RESPIRATORY (INHALATION)
Refills: 0 | OUTPATIENT
Start: 2025-03-14

## 2025-03-14 NOTE — TELEPHONE ENCOUNTER
No care due was identified.  [facility] Embedded Care Due Messages. Reference number: [id number].   3/14/2025 10:23:34 AM CDT

## 2025-03-14 NOTE — TELEPHONE ENCOUNTER
Refill Decision Note   Christina Bunn  is requesting a refill authorization.  Brief Assessment and Rationale for Refill:  Quick Discontinue     Medication Therapy Plan:  Pharmacy is requesting new scripts for the following medications without required information, (sig/ frequency/qty/etc)      Comments: Pharmacies have been requesting medications for patients without required information, (sig, frequency, qty, etc.). In addition, requests are sent for medication(s) pt. are currently not taking, and medications patients have never taken.    We have spoken to the pharmacies about these request types and advised their teams previously that we are unable to assess these New Script requests and require all details for these requests. This is a known issue and has been reported.    Note composed:1:47 PM 03/14/2025

## 2025-04-06 DIAGNOSIS — G47.00 INSOMNIA, UNSPECIFIED TYPE: ICD-10-CM

## 2025-04-07 RX ORDER — TRAZODONE HYDROCHLORIDE 50 MG/1
50 TABLET ORAL NIGHTLY
Qty: 90 TABLET | Refills: 0 | Status: SHIPPED | OUTPATIENT
Start: 2025-04-07

## 2025-04-07 NOTE — TELEPHONE ENCOUNTER
Provider Staff:  Action required for this patient    Requires appointment      Please see care gap opportunities below in Care Due Message.    Thanks!  [facility]     Appointments      Date Provider   Last Visit   1/25/2024 Nathaniel Thomas MD   Next Visit   Visit date not found Nathaniel Thomas MD     Refill Decision Note   Christina Bunn  is requesting a refill authorization.  Brief Assessment and Rationale for Refill:  Approve     Medication Therapy Plan:         Comments:     Note composed:6:17 AM 04/07/2025

## 2025-04-07 NOTE — TELEPHONE ENCOUNTER
Care Due:                  Date            Visit Type   Department     Provider  --------------------------------------------------------------------------------                                ESTABLISHED                              PATIENT -    [facility] FAMILY  Last Visit: 01-      [facility]      MEDICINE       Nathaniel Thomas  Next Visit: None Scheduled  None         None Found                                                            Last  Test          Frequency    Reason                     Performed    Due Date  --------------------------------------------------------------------------------    Office Visit  15 months..  famciclovir..............  01- 04-    [facility] Embedded Care Due Messages. Reference number: [id number].   4/06/2025 11:16:55 PM CDT

## 2025-05-30 DIAGNOSIS — J45.20 MILD INTERMITTENT ASTHMA WITHOUT COMPLICATION: ICD-10-CM

## 2025-05-30 RX ORDER — FLUTICASONE PROPIONATE AND SALMETEROL 100; 50 UG/1; UG/1
POWDER RESPIRATORY (INHALATION)
Qty: 180 EACH | Refills: 0 | Status: SHIPPED | OUTPATIENT
Start: 2025-05-30

## 2025-05-30 NOTE — TELEPHONE ENCOUNTER
Care Due:                  Date            Visit Type   Department     Provider  --------------------------------------------------------------------------------                                ESTABLISHED                              PATIENT -    [facility] FAMILY  Last Visit: 01-      [facility]       Nathaniel Thomas  Next Visit: None Scheduled  None         None Found                                                            Last  Test          Frequency    Reason                     Performed    Due Date  --------------------------------------------------------------------------------    CBC.........  12 months..  famciclovir..............  Not Found    Overdue    Cr..........  12 months..  famciclovir..............  Not Found    Overdue    [employer] Embedded Care Due Messages. Reference number: [id number].   5/30/2025 8:05:51 AM CDT

## 2025-05-30 NOTE — TELEPHONE ENCOUNTER
Refill Routing Note   Medication(s) are not appropriate for processing by [facility] for the following reason(s):        Patient not seen by provider within 15 months  Required vitals outdated    [facility] action(s):  Defer             Appointments  past 12m or future 3m with PCP    Date Provider   Last Visit   1/25/2024 Nathaniel Thomas MD   Next Visit   Visit date not found Nathaniel Thomas MD   ED visits in past 90 days: 0        Note composed:8:20 AM 05/30/2025

## 2025-07-16 ENCOUNTER — OFFICE VISIT (OUTPATIENT)
Dept: FAMILY MEDICINE | Facility: CLINIC | Age: 60
End: 2025-07-16
Payer: COMMERCIAL

## 2025-07-16 DIAGNOSIS — Z00.00 WELL ADULT EXAM: Primary | ICD-10-CM

## 2025-07-16 DIAGNOSIS — Z79.899 ENCOUNTER FOR LONG-TERM (CURRENT) USE OF MEDICATIONS: ICD-10-CM

## 2025-07-16 DIAGNOSIS — E55.9 VITAMIN D DEFICIENCY: ICD-10-CM

## 2025-07-16 DIAGNOSIS — J45.20 MILD INTERMITTENT ASTHMA WITHOUT COMPLICATION: ICD-10-CM

## 2025-07-16 DIAGNOSIS — Z11.4 SCREENING FOR HIV (HUMAN IMMUNODEFICIENCY VIRUS): ICD-10-CM

## 2025-07-16 DIAGNOSIS — G47.00 INSOMNIA, UNSPECIFIED TYPE: ICD-10-CM

## 2025-07-16 PROCEDURE — 1159F MED LIST DOCD IN RCRD: CPT | Mod: CPTII,95,, | Performed by: FAMILY MEDICINE

## 2025-07-16 PROCEDURE — 99396 PREV VISIT EST AGE 40-64: CPT | Mod: 95,,, | Performed by: FAMILY MEDICINE

## 2025-07-16 PROCEDURE — 1160F RVW MEDS BY RX/DR IN RCRD: CPT | Mod: CPTII,95,, | Performed by: FAMILY MEDICINE

## 2025-07-16 RX ORDER — TRAZODONE HYDROCHLORIDE 50 MG/1
50 TABLET ORAL NIGHTLY
Qty: 90 TABLET | Refills: 4 | Status: SHIPPED | OUTPATIENT
Start: 2025-07-16

## 2025-07-16 RX ORDER — FLUTICASONE PROPIONATE AND SALMETEROL 100; 50 UG/1; UG/1
POWDER RESPIRATORY (INHALATION)
Qty: 180 EACH | Refills: 1 | Status: SHIPPED | OUTPATIENT
Start: 2025-07-16

## 2025-07-16 NOTE — PATIENT INSTRUCTIONS
Follow up in about 6 months (around 1/16/2026), or if symptoms worsen or fail to improve.     Dear patient,   As a result of recent federal legislation (The Federal Cures Act), you may receive lab or pathology results from your visit in your [facility] account before your physician is able to contact you. Your physician or their representative will relay the results to you with their recommendations at their soonest availability.     If no improvement in symptoms or symptoms worsen, please be advised to call MD, follow-up at clinic and/or go to ER if becomes severe.    Nathaniel Thomas M.D.        We Offer TELEHEALTH & Same Day Appointments!   Book your Telehealth appointment with me through my nurse or   Clinic appointments on [employer]!    [address] [place] [address]    Office: [phone]   FAX: [phone]    Check out my Facebook Page and Follow Me at: [email or web address]    Check out my website at [email or web address] by clicking on: [email or web address]    To Schedule appointments [email or web address] go to [email or web address]: [email or web address]

## 2025-07-16 NOTE — PROGRESS NOTES
[facility]        NAME: Yong Matos is a 6 y o  female  : 2011    MRN: [id number]  DATE: 2020  TIME: 3:18 PM    Assessment and Plan   Urinary tract infection without hematuria, site unspecified [N39 0]  1  Urinary tract infection without hematuria, site unspecified  POCT urine dip    amoxicillin-clavulanate (AUGMENTIN) 400-57 mg/5 mL suspension    Urine culture         Patient Instructions     Patient Instructions   -Urine dip is positive for large blood, nitrite positive and large leukocytes  -Urine culture is pending- I will call you if your antibiotic needs to be changed  -Take antibiotic as directed  -Increase fluids  -Use tylenol/motrin as needed  -Follow-up with PCP within 5-7 days    Go to ER with worsening symptoms, high fever, flank pain, vomiting, or any signs of distress       Follow up with PCP in 3-5 days  Proceed to  ER if symptoms worsen  Chief Complaint     Chief Complaint   Patient presents with    Possible UTI     Pt c/o painful urination with blood that started a few hours ago  History of Present Illness       Patient is an 6year-old female who presents today with her father for evaluation of a possible urinary tract infection  Patient started complaining of pain with urinating this morning and noticed blood on the toilet paper after she wiped  Patient still continues to having pain with urinating  No fever  No vomiting  Patient denies any injury or trauma  She states she feels safe at home  She splits her time between her mom and her dad  Patient has not had a Urinary tract infection previously  Review of Systems   Review of Systems   Constitutional: Negative for chills and fever  Respiratory: Negative for shortness of breath  Cardiovascular: Negative for chest pain  Gastrointestinal: Negative for abdominal pain, diarrhea and vomiting  Genitourinary: Positive for dysuria and hematuria  Negative for flank pain     Skin: Negative for The patient location is: [place]  The chief complaint leading to consultation is:   Chief Complaint   Patient presents with    Annual Exam         Visit type: audiovisual    Face to Face time with patient: 12  21 minutes of total time spent on the encounter, which includes face to face time and non-face to face time preparing to see the patient (eg, review of tests), Obtaining and/or reviewing separately obtained history, Documenting clinical information in the electronic or other health record, Independently interpreting results (not separately reported) and communicating results to the patient/family/caregiver, or Care coordination (not separately reported).         Each patient to whom he or she provides medical services by telemedicine is:  (1) informed of the relationship between the physician and patient and the respective role of any other health care provider with respect to management of the patient; and (2) notified that he or she may decline to receive medical services by telemedicine and may withdraw from such care at any time.  Primary Care Telemedicine Note  The patient location is:  Patient's Home - [place]  The chief complaint leading to consultation is:   Chief Complaint   Patient presents with    Annual Exam      Total time:  see MDM below. The total time spent on the encounter, which includes face to face time and non-face to face time preparing to see the patient (eg, review of previous medical records, tests), Obtaining and/or reviewing separately obtained history, documenting clinical information in the electronic or other health record, independently interpreting results (not separately reported)/communicating results to the patient/family/caregiver, and/or care coordination (not separately reported).    Visit type: Virtual visit with synchronous audio and video  Each patient to whom he or she provides medical services by telemedicine is:  (1) informed of the relationship between the  physician and patient and the respective role of any other health care provider with respect to management of the patient; and (2) notified that he or she may decline to receive medical services by telemedicine and may withdraw from such care at any time.  =================================================================  This note is specifically for wellness visit performed today.   WELLNESS EXAM      Patient ID: Christina Bunn is a 60 y.o. female with  has a past medical history of Asthma.   Chief Complaint:  Encounter for wellness exam    Well Adult Physical: Patient here for a comprehensive physical exam.The patient reports Chronic problems.  History of Present Illness    CHIEF COMPLAINT:  Patient presents for an annual wellness visit, prescription medication renewals, and to review chronic medical conditions.    HPI:  Patient, a 60-year-old female, is attending a telemedicine appointment for her annual wellness visit. She inquires about obtaining Atarax results and reports not having had labs done in approximately 4 years. She confirms using [facility] for her prescriptions. She has asthma, for which she uses an inhaler (Symbicort). She had a negative mammogram in October. She expresses willingness to undergo a colonoscopy as discussed in a previous appointment. She denies any specific symptoms or concerns during this visit.    MEDICATIONS:  Patient is on trazodone 50 mg with a 90-day supply and 4 refills. She is also using inhaler,for her asthma management.    MEDICAL HISTORY:  Patient has a history of asthma.      ROS:  ROS as indicated in HPI.          January 2024:  Patient reports she is overall doing well.  She has been having some issues with her foot fracture.  She is following with specialist for this.  Patient needing refills on medication.  Patient reports having a cough and would like treatment for her cough.    Reviewed Care team: Prev PCP: Dr. Ajay MAHMOOD: Alysia Bhandari NP [facility]  rash    All other systems reviewed and are negative  Current Medications       Current Outpatient Medications:     amoxicillin-clavulanate (AUGMENTIN) 400-57 mg/5 mL suspension, Take 8 6 mL (688 mg total) by mouth 2 (two) times a day for 7 days, Disp: 125 mL, Rfl: 0    azithromycin (ZITHROMAX) 200 mg/5 mL suspension, Give the patient 292 mg (7 3 ml) by mouth the first day then 148 mg (3 7 ml) by mouth daily for 4 days  (Patient not taking: Reported on 3/29/2020), Disp: 30 mL, Rfl: 0    Current Allergies     Allergies as of 03/29/2020    (No Known Allergies)            The following portions of the patient's history were reviewed and updated as appropriate: allergies, current medications, past family history, past medical history, past social history, past surgical history and problem list      History reviewed  No pertinent past medical history  History reviewed  No pertinent surgical history  Family History   Problem Relation Age of Onset    No Known Problems Mother     No Known Problems Father     Cancer Maternal Grandmother     COPD Maternal Grandmother     Asthma Maternal Grandmother     Cancer Paternal Grandfather     Hypertension Paternal Grandfather     Cancer Family     Addiction problem Neg Hx     Mental illness Neg Hx          Medications have been verified  Objective   Pulse (!) 108   Temp 99 2 °F (37 3 °C) (Tympanic)   Resp 18   Wt 30 7 kg (67 lb 9 6 oz)   SpO2 98%        Physical Exam     Physical Exam   Constitutional: She appears well-developed and well-nourished  She is active  No distress  Cardiovascular: Normal rate, regular rhythm, S1 normal and S2 normal    Pulmonary/Chest: Effort normal and breath sounds normal  She has no wheezes  She has no rhonchi  Abdominal: Soft  There is no tenderness  Genitourinary:       No vaginal discharge found  Neurological: She is alert  Nursing note and vitals reviewed  "Simon Larson    Patient here for establish care appointment and for medication refills.  Patient reports that she is needing her as needed Xanax.  Previously prescribed by Dr. Moss who is retiring.  Patient has rare usage.   reviewed.  Patient also complains of insomnia.  She has tried over-the-counter medications which do not help.  She has also tried Xanax at night but gives her side effects when used for this condition.  Denies any SI HI hallucinations.  Patient reports severe allergic reactions to certain things and requires an EpiPen.  She is due for a refill.  Patient also needing refill on famciclovir for outbreaks of cold sores occasionally.    Chronic. Vit d deficiency. Takes vitamin d supplement. No SE reported. Fatigue is slightly improved.   No results found for: "[id number]"     Do you take any herbs or supplements that were not prescribed by a doctor? no   Are you taking calcium supplements? no    History:  Requesting records  LMP: Patient's last menstrual period was 01/28/2015 (approximate).   MMG:   ovarian cancer in 3 relatives (daughter, maternal grandmother (comments: older, age unknown), mother (age of onset: 45)).   Mammo Digital Screening Bilat w/ Ry  Narrative: Facility:  [address] [id number] [phone]    Name: Christina Bunn    MRN: [id number]    Result:   Mammo Digital Screening Bilat w/ Ry     History:  Patient is 59 y.o. and is seen for encounter for screening mammogram for   malignant neoplasm of breast.    Films Compared:  Compared to: 09/27/2023 Mammo Digital Screening Bilat w/ Ry, 09/19/2022   Mammo Digital Screening Bilat w/ Ry, and 09/17/2021 Mammo Digital   Screening Bilat w/ Ry     Findings:  This procedure was performed using tomosynthesis. Computer-aided detection   was utilized in the interpretation of this examination.    The breasts are heterogeneously dense, " which may obscure small masses.   There is no evidence of suspicious masses, calcifications, or other   abnormal findings.  Impression: Bilateral  There is no mammographic evidence of malignancy.    BI-RADS Category:   Overall: 1 - Negative       Recommendation:  Routine screening mammogram in 1 year is recommended.    Your estimated lifetime risk of breast cancer (to age 85) based on   Tyrer-Cuzick risk assessment model is 15.32%.  According to the American   Cancer Society, patients with a lifetime breast cancer risk of 20% or   higher might benefit from supplemental screening tests, such as screening   breast MRI.    Annika Simon MD    Patient information entered into a reminder system with a target due date   for the above recommendation.    Assessment    Overall   2 - Benign   Mammography Recommendations     Side Due   Routine Screening Mammogram in 1 year  9/26/2024     Breast Density     Overall   Breast Composition c - Heterogeneously dense   Details    Reading Physician Reading Date Result Priority   Annika Simon MD  [phone] 10/2/2023 Routine     Physician Responsible for MQSA Outcome Reason    Annika Simon MD Signed      Narrative & Impression  Result:   Mammo Digital Screening Bilat w/ Ry     History:  Patient is 58 y.o. and is seen for a screening mammogram.     Films Compared:  Prior images (if available) were compared.     Findings:  This procedure was performed using tomosynthesis. Computer-aided detection was utilized in the interpretation of this examination.  The breasts are heterogeneously dense, which may obscure small masses. There is no evidence of suspicious masses, calcifications, or other abnormal findings.     Impression:  Bilateral  There is no mammographic evidence of malignancy.     BI-RADS Category:   Overall: 2 - Benign        Recommendation:  Routine screening mammogram in 1 year is recommended.        Your estimated lifetime risk of breast cancer (to age 85)  based on Tyrer-Cuzick risk assessment model is Tyrer-Cuzick: 18.09 %. According to the American Cancer Society, patients with a lifetime breast cancer risk of 20% or higher might benefit from supplemental screening tests.        Patient information entered into a reminder system with a target due date for the above recommendation.             Exam Ended: 09/27/23 13:59 CDT           Impression:  Bilateral  There is no mammographic evidence of malignancy.     BI-RADS Category:   Overall: 2 - Benign     Recommendation:  Routine screening mammogram in 1 year is recommended.        Your estimated lifetime risk of breast cancer (to age 85) based on Tyrer-Cuzick risk assessment model is Tyrer-Cuzick: 22.81 %. According to the American Cancer Society, patients with a lifetime breast cancer risk of 20% or higher might benefit from supplemental screening tests, such as screening breast MRI.        Patient information entered into a reminder system with a target due date for the above recommendation.        Exam Ended: 09/17/21 11:13          PAP: 8/29/2019 requesting records  Colon cancer screening: Last Colonoscopy completed on 10/12/2019   Findings:       Skin tags were found on perianal exam.        A 3 mm polyp was found in the ascending colon. The polyp was        sessile. The polyp was removed with a cold biopsy forceps. Resection        and retrieval were complete.        A 8 mm polyp was found in the cecum. The polyp was sessile. The        polyp was removed with a saline injection-lift technique using a        cold snare. Resection and retrieval were complete.        No additional abnormalities were found on retroflexion.   Impression:           - Perianal skin tags found on perianal exam.                         - One 3 mm polyp in the ascending colon, removed                         with a cold biopsy forceps. Resected and retrieved.                         - One 8 mm polyp in the cecum, removed using                          injection-lift and a hot snare. Resected and                         retrieved.   Recommendation:       - Patient has a contact number available for                         emergencies. The signs and symptoms of potential                         delayed complications were discussed with the                         patient. Return to normal activities tomorrow.                         Written discharge instructions were provided to the                         patient.                         - Discharge patient to home (via wheelchair).                         - Resume previous diet today.                         - Continue present medications.                         - No aspirin, ibuprofen, naproxen, or other                         non-steroidal anti-inflammatory drugs for 7 days                         after polyp removal.                         - Await pathology results.                         - Repeat colonoscopy in 5 years for surveillance.   MD Krista Renae MD   10/12/2019 10:22:02 AM   Health Maintenance Topics with due status: Not Due       Topic Last Completion Date    Influenza Vaccine 2009    Lipid Panel 10/13/2021    Cervical Cancer Screening 2023    TETANUS VACCINE 2024    RSV Vaccine (Age 60+ and Pregnant patients) Not Due      ==============================================  History reviewed.   Health Maintenance Due   Topic Date Due    HIV Screening  Never done    Colorectal Cancer Screening  10/12/2024    Hemoglobin A1c (Diabetic Prevention Screening)  10/13/2024    Mammogram  10/03/2025   Patient declines vaccinations.    Past Medical History:  Past Medical History:   Diagnosis Date    Asthma      Past Surgical History:   Procedure Laterality Date    ADENOIDECTOMY  1970    APPENDECTOMY       SECTION      COLONOSCOPY N/A 10/12/2019    Procedure: COLONOSCOPY;  Surgeon: Krista Tolbert MD;  [facility];  Service: Endoscopy;   Laterality: N/A;    ENDOMETRIAL ABLATION  2013    OOPHORECTOMY      one ovary removed    TONSILLECTOMY      TUBAL LIGATION       Review of patient's allergies indicates:   Allergen Reactions    Iodinated contrast media Shortness Of Breath and Rash    Insect venom Other (See Comments)     Current Outpatient Medications on File Prior to Visit   Medication Sig Dispense Refill    albuterol (PROVENTIL/VENTOLIN HFA) 90 mcg/actuation inhaler Inhale 2 puffs into the lungs every 6 (six) hours as needed for Wheezing or Shortness of Breath. 54 g 0    ALPRAZolam (XANAX) 0.25 MG tablet Take 1 tablet (0.25 mg total) by mouth 3 (three) times daily as needed for Anxiety. 90 tablet 0    famciclovir (FAMVIR) 500 MG tablet TAKE ONE TABLET BY MOUTH EVERY 8 HOURS -IMPORTANT: FINISH ALL OF THISMEDICINE UNLESS OTHERWISE DIRECTED BY YOUR DOCTOR. 30 tablet 12    hydroxocobalamin 1,000 mcg/mL Soln       L-METHYLFOLATE 15 mg Tab       progesterone (PROMETRIUM) 100 MG capsule       progesterone (PROMETRIUM) 200 MG capsule       VITAMIN B-6 50 MG tablet       VITAMIN D2 50,000 unit capsule       [DISCONTINUED] fluticasone-salmeterol diskus inhaler 100-50 mcg USE 1 INHALATION TWICE A  each 0    [DISCONTINUED] traZODone (DESYREL) 50 MG tablet TAKE 1 TABLET EVERY EVENING 90 tablet 0    EPINEPHrine (EPIPEN 2-MAR) 0.3 mg/0.3 mL AtIn Inject 0.3 mLs (0.3 mg total) into the muscle once. for 1 dose (Patient not taking: Reported on 7/16/2025) 2 each 0     No current facility-administered medications on file prior to visit.     Social History     Socioeconomic History    Marital status:    Tobacco Use    Smoking status: Never    Smokeless tobacco: Never   Substance and Sexual Activity    Alcohol use: Yes     Comment: wine social    Drug use: No    Sexual activity: Yes     Partners: Male     Birth control/protection: Surgical     Social Drivers of Health     Financial Resource Strain: Low Risk  (7/15/2025)    Overall Financial Resource Strain  (CARDIA)     Difficulty of Paying Living Expenses: Not hard at all   Food Insecurity: No Food Insecurity (7/15/2025)    Hunger Vital Sign     Worried About Running Out of Food in the Last Year: Never true     Ran Out of Food in the Last Year: Never true   Transportation Needs: No Transportation Needs (7/15/2025)    PRAPARE - Transportation     Lack of Transportation (Medical): No     Lack of Transportation (Non-Medical): No   Physical Activity: Insufficiently Active (7/15/2025)    Exercise Vital Sign     Days of Exercise per Week: 3 days     Minutes of Exercise per Session: 30 min   Stress: No Stress Concern Present (7/15/2025)    [employer] [place] [employer] - Occupational Stress Questionnaire     Feeling of Stress : Only a little   Housing Stability: Low Risk  (7/15/2025)    Housing Stability Vital Sign     Unable to Pay for Housing in the Last Year: No     Number of Times Moved in the Last Year: 0     Homeless in the Last Year: No     Family History   Problem Relation Name Age of Onset    Cancer Mother Na         ovarian    Ovarian cancer Mother Na 45    Heart disease Father Na     Kidney disease Paternal Aunt Na     Cancer Maternal Grandmother Na     Ovarian cancer Maternal Grandmother Na         older, age unknown    Ovarian cancer Daughter Rosibel        Review of Systems   Constitutional:  Negative for activity change and unexpected weight change.   HENT:  Negative for hearing loss, rhinorrhea and trouble swallowing.    Eyes:  Negative for discharge and visual disturbance.   Respiratory:  Negative for chest tightness and wheezing.    Cardiovascular:  Negative for chest pain and palpitations.   Gastrointestinal:  Negative for blood in stool, constipation, diarrhea and vomiting.   Endocrine: Negative for polydipsia and polyuria.   Genitourinary:  Negative for difficulty urinating, dysuria, hematuria and menstrual problem.   Musculoskeletal:  Negative for arthralgias, joint swelling and neck pain.    Neurological:  Negative for weakness and headaches.   Psychiatric/Behavioral:  Negative for confusion and dysphoric mood.       Objective:    Nursing note and vitals reviewed.  There were no vitals filed for this visit.    There is no height or weight on file to calculate BMI.   Physical Exam  Constitutional:       General: She is not in acute distress.     Appearance: She is well-developed. She is not ill-appearing, toxic-appearing or diaphoretic.   HENT:      Head: Normocephalic and atraumatic.      Right Ear: Hearing and external ear normal.      Left Ear: Hearing and external ear normal.   Eyes:      General: Lids are normal.      Conjunctiva/sclera: Conjunctivae normal.   Pulmonary:      Effort: Pulmonary effort is normal. No respiratory distress.   Musculoskeletal:         General: Normal range of motion.      Cervical back: Normal range of motion.   Skin:     Coloration: Skin is not pale.   Neurological:      Mental Status: She is alert. She is not disoriented.   Psychiatric:         Attention and Perception: She is attentive.         Mood and Affect: Mood is not anxious or depressed.         Speech: Speech is not rapid and pressured or slurred.         Behavior: Behavior normal. Behavior is not agitated, aggressive or hyperactive. Behavior is cooperative.         Thought Content: Thought content normal. Thought content is not paranoid or delusional. Thought content does not include homicidal or suicidal ideation. Thought content does not include homicidal or suicidal plan.         Cognition and Memory: Memory is not impaired.         Judgment: Judgment normal.       Lab Visit on 10/13/2021   Component Date Value Ref Range Status    WBC 10/13/2021 5.89  3.90 - 12.70 K/uL Final    RBC 10/13/2021 4.16  4.00 - 5.40 M/uL Final    Hemoglobin 10/13/2021 12.8  12.0 - 16.0 g/dL Final    Hematocrit 10/13/2021 40.3  37.0 - 48.5 % Final    MCV 10/13/2021 97  82 - 98 fL Final    MCH 10/13/2021 30.8  27.0 - 31.0 pg  Final    MCHC 10/13/2021 31.8 (L)  32.0 - 36.0 g/dL Final    RDW 10/13/2021 13.6  11.5 - 14.5 % Final    Platelets 10/13/2021 349  150 - 450 K/uL Final    MPV 10/13/2021 10.0  9.2 - 12.9 fL Final    TSH 10/13/2021 1.862  0.400 - 4.000 uIU/mL Final    Sodium 10/13/2021 143  136 - 145 mmol/L Final    Potassium 10/13/2021 4.4  3.5 - 5.1 mmol/L Final    Chloride 10/13/2021 108  95 - 110 mmol/L Final    CO2 10/13/2021 19 (L)  23 - 29 mmol/L Final    Glucose 10/13/2021 83  70 - 110 mg/dL Final    BUN 10/13/2021 13  6 - 20 mg/dL Final    Creatinine 10/13/2021 0.8  0.5 - 1.4 mg/dL Final    Calcium 10/13/2021 10.1  8.7 - 10.5 mg/dL Final    Total Protein 10/13/2021 7.7  6.0 - 8.4 g/dL Final    Albumin 10/13/2021 4.2  3.5 - 5.2 g/dL Final    Total Bilirubin 10/13/2021 0.4  0.1 - 1.0 mg/dL Final    Comment: For infants and newborns, interpretation of results should be based  on gestational age, weight and in agreement with clinical  observations.    Premature Infant recommended reference ranges:  Up to 24 hours.............<8.0 mg/dL  Up to 48 hours............<12.0 mg/dL  3-5 days..................<15.0 mg/dL  6-29 days.................<15.0 mg/dL      Alkaline Phosphatase 10/13/2021 85  55 - 135 U/L Final    AST 10/13/2021 22  10 - 40 U/L Final    ALT 10/13/2021 22  10 - 44 U/L Final    Anion Gap 10/13/2021 16  8 - 16 mmol/L Final    eGFR if African American 10/13/2021 >60.0  >60 mL/min/1.73 m^2 Final    eGFR if non African American 10/13/2021 >60.0  >60 mL/min/1.73 m^2 Final    Comment: Calculation used to obtain the estimated glomerular filtration  rate (eGFR) is the CKD-EPI equation.       Hemoglobin A1C 10/13/2021 5.2  4.0 - 5.6 % Final    Comment: ADA Screening Guidelines:  5.7-6.4%  Consistent with prediabetes  >or=6.5%  Consistent with diabetes    High levels of fetal hemoglobin interfere with the HbA1C  assay. Heterozygous hemoglobin variants (HbS, HgC, etc)do  not significantly interfere with this assay.   However,  presence of multiple variants may affect accuracy.      Estimated Avg Glucose 10/13/2021 103  68 - 131 mg/dL Final    Cholesterol 10/13/2021 223 (H)  120 - 199 mg/dL Final    Comment: The National Cholesterol Education Program (NCEP) has set the  following guidelines (reference ranges) for Cholesterol:  Optimal.....................<200 mg/dL  Borderline High.............200-239 mg/dL  High........................> or = 240 mg/dL      Triglycerides 10/13/2021 75  30 - 150 mg/dL Final    Comment: The National Cholesterol Education Program (NCEP) has set the  following guidelines (reference values) for triglycerides:  Normal......................<150 mg/dL  Borderline High.............150-199 mg/dL  High........................200-499 mg/dL      HDL 10/13/2021 62  40 - 75 mg/dL Final    Comment: The National Cholesterol Education Program (NCEP) has set the  following guidelines (reference values) for HDL Cholesterol:  Low...............<40 mg/dL  Optimal...........>60 mg/dL      LDL Cholesterol 10/13/2021 146.0  63.0 - 159.0 mg/dL Final    Comment: The National Cholesterol Education Program (NCEP) has set the  following guidelines (reference values) for LDL Cholesterol:  Optimal.......................<130 mg/dL  Borderline High...............130-159 mg/dL  High..........................160-189 mg/dL  Very High.....................>190 mg/dL      HDL/Cholesterol Ratio 10/13/2021 27.8  20.0 - 50.0 % Final    Total Cholesterol/HDL Ratio 10/13/2021 3.6  2.0 - 5.0 Final    Non-HDL Cholesterol 10/13/2021 161  mg/dL Final    Comment: Risk category and Non-HDL cholesterol goals:  Coronary heart disease (CHD)or equivalent (10-year risk of CHD >20%):  Non-HDL cholesterol goal     <130 mg/dL  Two or more CHD risk factors and 10-year risk of CHD <= 20%:  Non-HDL cholesterol goal     <160 mg/dL  0 to 1 CHD risk factor:  Non-HDL cholesterol goal     <190 mg/dL      Physical Exam    IMAGING:  Patient underwent a mammogram in October,  which yielded negative results.       Assessment / Plan:      1.  ANNUAL WELLNESS EXAM -patient here for annual wellness exam.  Labs ordered.  Health maintenance was reviewed and ordered.  Medications were reviewed and reconciled.   Anticipatory guidance: Don't smoke.  Healthy diet and regular exercise recommended. Vaccine recommendations discussed.  See orders.  Reviewed Anticipatory guidance, risk factor reduction interventions and counseling, Complete history , physical was completed today.  Complete and thorough medication reconciliation was performed.  Discussed risks and benefits of medications.  Advised patient on orders and health maintenance.  We discussed old records and old labs if available.  Will request any records not available through epic.  Continue current medications listed on your summary sheet.  Assessment & Plan    Z00.00 Well adult exam  Z79.899 Encounter for long-term (current) use of medications  Z11.4 Screening for HIV (human immunodeficiency virus)  E55.9 Vitamin D deficiency  G47.00 Insomnia, unspecified type  J45.20 Mild intermittent asthma without complication    IMPRESSION:   Conducted annual wellness visit via telemedicine.   Reviewed chronic medical conditions and medication needs.   Assessed preventive care status, including mammogram and vaccinations.   Recommend resuming annual labs for trending purposes.    PLAN SUMMARY:   Continue Symbicort inhaler for asthma   Continue trazodone 50 mg, 90-day supply with 4 refills   Ordered labs to be completed on Tuesday   Follow up in 1 year for annual wellness visit    WELL ADULT EXAM:   Follow up in 1 year for annual wellness visit.   Ordered labs, to be completed on Tuesday.    ENCOUNTER FOR LONG-TERM (CURRENT) USE OF MEDICATIONS:   Continued Triamcinolone 50 mg, 90-day supply with 4 refills.    MILD INTERMITTENT ASTHMA WITHOUT COMPLICATION:   Continued Symbicort inhaler for asthma.       Discussed insomnia condition course.  Advised of  first-line medications for this condition.  Also discussed sleep hygiene.  Information was given below.  Good sleep habits (sometimes referred to as sleep hygiene) can help you get a good nights sleep.    Some habits that can improve your sleep health:  -Be consistent. Go to bed at the same time each night and get up at the same time each morning, including on the weekends  -Make sure your bedroom is quiet, dark, relaxing, and at a comfortable temperature  -Remove electronic devices, such as TVs, computers, and smart phones, from the bedroom  -Avoid large meals, caffeine, and alcohol before bedtime  -Get some exercise. Being physically active during the day can help you fall asleep more easily at night.    Anxiety:  Continue Xanax as needed for panic attacks.The patient was checked in the [facility] [employer] [facility] [employer]'s  Prescription Monitoring Program. There appears to be no incongruities with the patient's verbalized history. Please be advised of condition course.  SNRI/SSRI is first-line treatment for this condition.  Please be advised of the risk of discontinuing this medication without tapering/contacting MD.  Patient has been advised of side effects, and all questions were answered.  Patient voiced understanding.  Patient will follow up routinely and notify us if having any side effects or worsening or persistent symptoms.  ER precautions were given. Antidepressant/Antianxiety Medication Initiation:  Patient informed of risks, benefits, and potential side effects of medication and accepts informed consent.  Common side effects include nausea, fatigue, headache, insomnia, etc see medication insert for complete side effect profile.  Most importantly be advised of the possibility of new or worsening suicidal thoughts/depression/anxiety etcetera.  Please be advised to stop the medication immediately and seek urgent treatment if this occurs.  Therefore please do not to abruptly discontinue medication without  physician guidance except in cases of sudden onset or worsening of SI.   Insomnia:  Continue trazodone 50 milligrams daily.  Titrate up if needed.Discussed insomnia condition course.  Advised of first-line medications for this condition.  Also discussed sleep hygiene.  Information was given below.  Good sleep habits (sometimes referred to as sleep hygiene) can help you get a good nights sleep.    Some habits that can improve your sleep health:  -Be consistent. Go to bed at the same time each night and get up at the same time each morning, including on the weekends  -Make sure your bedroom is quiet, dark, relaxing, and at a comfortable temperature  -Remove electronic devices, such as TVs, computers, and smart phones, from the bedroom  -Avoid large meals, caffeine, and alcohol before bedtime  -Get some exercise. Being physically active during the day can help you fall asleep more easily at night.    Avoid triggers for allergic reactions.  Refill EpiPen.  Refill famciclovir for cold sores.  Follow-up sooner if uncontrolled.Discussed condition course and signs and symptoms to expect.  Patient advised take anti-inflammatories and or Tylenol for pain or fever.  ER precautions.  Call MD or follow-up to clinic if not improving or worsening symptoms.  Asthma:  Refill inhalers.  Discussed asthma action plan.  ER precautions.  All questions were answered. Patient had no further concerns. Advised of Wellness plan. Follow up in 1 year for ANNUAL WELLNESS EXAM  History of cold sores: Continue Famvir per flare.    Cough:  Start promethazine DM.  Discussed condition course and signs and symptoms to expect.  Patient advised take anti-inflammatories and or Tylenol for pain or fever.  ER precautions.  Call MD or follow-up to clinic if not improving or worsening symptoms.    Orders Placed This Encounter   Procedures    Lipid Panel     Standing Status:   Future     Expected Date:   7/16/2025     Expiration Date:   9/14/2026    HIV  1/2 Ag/Ab (4th Gen)     Standing Status:   Future     Expected Date:   7/16/2025     Expiration Date:   9/14/2026    Hemoglobin A1C     Standing Status:   Future     Expected Date:   7/16/2025     Expiration Date:   9/14/2026    CBC Without Differential     Standing Status:   Future     Expected Date:   7/16/2025     Expiration Date:   9/14/2026    Comprehensive Metabolic Panel     Standing Status:   Future     Expected Date:   7/16/2025     Expiration Date:   9/14/2026    TSH     Standing Status:   Future     Expected Date:   7/16/2025     Expiration Date:   9/14/2026    Vitamin D     Standing Status:   Future     Expected Date:   7/16/2025     Expiration Date:   9/14/2026    Ambulatory referral/consult to Endo Procedure      Standing Status:   Future     Expected Date:   7/17/2025     Expiration Date:   1/31/2026     Referral Priority:   Routine     Referral Type:   Consultation     Number of Visits Requested:   1     Medications Ordered This Encounter   Medications    fluticasone-salmeterol diskus inhaler 100-50 mcg     Sig: USE 1 INHALATION TWICE A DAY     Dispense:  180 each     Refill:  1    traZODone (DESYREL) 50 MG tablet     Sig: Take 1 tablet (50 mg total) by mouth every evening.     Dispense:  90 tablet     Refill:  4      Future Appointments       Date Specialty Appt Notes    7/22/2025 Lab              Nathaniel Thomas MD

## 2025-07-23 ENCOUNTER — LAB VISIT (OUTPATIENT)
Dept: LAB | Facility: HOSPITAL | Age: 60
End: 2025-07-23
Attending: FAMILY MEDICINE
Payer: COMMERCIAL

## 2025-07-23 DIAGNOSIS — E55.9 VITAMIN D DEFICIENCY: ICD-10-CM

## 2025-07-23 DIAGNOSIS — Z79.899 ENCOUNTER FOR LONG-TERM (CURRENT) USE OF MEDICATIONS: ICD-10-CM

## 2025-07-23 DIAGNOSIS — Z11.4 SCREENING FOR HIV (HUMAN IMMUNODEFICIENCY VIRUS): ICD-10-CM

## 2025-07-23 LAB
25(OH)D3+25(OH)D2 SERPL-MCNC: 68 NG/ML (ref 30–96)
ALBUMIN SERPL BCP-MCNC: 4.2 G/DL (ref 3.5–5.2)
ALP SERPL-CCNC: 66 UNIT/L (ref 40–150)
ALT SERPL W/O P-5'-P-CCNC: 13 UNIT/L (ref 10–44)
ANION GAP (OHS): 8 MMOL/L (ref 8–16)
AST SERPL-CCNC: 18 UNIT/L (ref 11–45)
BILIRUB SERPL-MCNC: 0.3 MG/DL (ref 0.1–1)
BUN SERPL-MCNC: 12 MG/DL (ref 6–20)
CALCIUM SERPL-MCNC: 9.1 MG/DL (ref 8.7–10.5)
CHLORIDE SERPL-SCNC: 106 MMOL/L (ref 95–110)
CHOLEST SERPL-MCNC: 207 MG/DL (ref 120–199)
CHOLEST/HDLC SERPL: 4.1 {RATIO} (ref 2–5)
CO2 SERPL-SCNC: 26 MMOL/L (ref 23–29)
CREAT SERPL-MCNC: 0.8 MG/DL (ref 0.5–1.4)
EAG (OHS): 100 MG/DL (ref 68–131)
ERYTHROCYTE [DISTWIDTH] IN BLOOD BY AUTOMATED COUNT: 13.8 % (ref 11.5–14.5)
GFR SERPLBLD CREATININE-BSD FMLA CKD-EPI: >60 ML/MIN/1.73/M2
GLUCOSE SERPL-MCNC: 84 MG/DL (ref 70–110)
HBA1C MFR BLD: 5.1 % (ref 4–5.6)
HCT VFR BLD AUTO: 39.8 % (ref 37–48.5)
HDLC SERPL-MCNC: 50 MG/DL (ref 40–75)
HDLC SERPL: 24.2 % (ref 20–50)
HGB BLD-MCNC: 12.6 GM/DL (ref 12–16)
HIV 1+2 AB+HIV1 P24 AG SERPL QL IA: NORMAL
LDLC SERPL CALC-MCNC: 143.6 MG/DL (ref 63–159)
MCH RBC QN AUTO: 30.1 PG (ref 27–31)
MCHC RBC AUTO-ENTMCNC: 31.7 G/DL (ref 32–36)
MCV RBC AUTO: 95 FL (ref 82–98)
NONHDLC SERPL-MCNC: 157 MG/DL
PLATELET # BLD AUTO: 302 K/UL (ref 150–450)
PMV BLD AUTO: 9.6 FL (ref 9.2–12.9)
POTASSIUM SERPL-SCNC: 3.9 MMOL/L (ref 3.5–5.1)
PROT SERPL-MCNC: 7.1 GM/DL (ref 6–8.4)
RBC # BLD AUTO: 4.19 M/UL (ref 4–5.4)
SODIUM SERPL-SCNC: 140 MMOL/L (ref 136–145)
TRIGL SERPL-MCNC: 67 MG/DL (ref 30–150)
TSH SERPL-ACNC: 1.5 UIU/ML (ref 0.4–4)
WBC # BLD AUTO: 5.46 K/UL (ref 3.9–12.7)

## 2025-07-23 PROCEDURE — 84443 ASSAY THYROID STIM HORMONE: CPT

## 2025-07-23 PROCEDURE — 83718 ASSAY OF LIPOPROTEIN: CPT

## 2025-07-23 PROCEDURE — 87389 HIV-1 AG W/HIV-1&-2 AB AG IA: CPT

## 2025-07-23 PROCEDURE — 82306 VITAMIN D 25 HYDROXY: CPT

## 2025-07-23 PROCEDURE — 85027 COMPLETE CBC AUTOMATED: CPT

## 2025-07-23 PROCEDURE — 80053 COMPREHEN METABOLIC PANEL: CPT

## 2025-07-23 PROCEDURE — 83036 HEMOGLOBIN GLYCOSYLATED A1C: CPT

## 2025-07-23 PROCEDURE — 36415 COLL VENOUS BLD VENIPUNCTURE: CPT | Mod: PO
